# Patient Record
Sex: FEMALE | Race: BLACK OR AFRICAN AMERICAN | NOT HISPANIC OR LATINO | Employment: FULL TIME | URBAN - METROPOLITAN AREA
[De-identification: names, ages, dates, MRNs, and addresses within clinical notes are randomized per-mention and may not be internally consistent; named-entity substitution may affect disease eponyms.]

---

## 2017-01-13 ENCOUNTER — HOSPITAL ENCOUNTER (EMERGENCY)
Facility: HOSPITAL | Age: 27
Discharge: HOME | End: 2017-01-13
Attending: EMERGENCY MEDICINE | Admitting: EMERGENCY MEDICINE

## 2017-01-13 VITALS
SYSTOLIC BLOOD PRESSURE: 115 MMHG | HEART RATE: 60 BPM | DIASTOLIC BLOOD PRESSURE: 90 MMHG | OXYGEN SATURATION: 93 % | HEIGHT: 69 IN | BODY MASS INDEX: 19.99 KG/M2 | TEMPERATURE: 97.3 F | WEIGHT: 135 LBS | RESPIRATION RATE: 16 BRPM

## 2017-01-13 DIAGNOSIS — R56.9 SEIZURE (HCC): ICD-10-CM

## 2017-01-13 DIAGNOSIS — N39.0 ACUTE UTI: Primary | ICD-10-CM

## 2017-01-13 LAB
ALBUMIN SERPL-MCNC: 4 G/DL (ref 3.5–5.2)
ALBUMIN/GLOB SERPL: 1 G/DL
ALP SERPL-CCNC: 70 U/L (ref 39–117)
ALT SERPL W P-5'-P-CCNC: 15 U/L (ref 1–33)
AMPHET+METHAMPHET UR QL: NEGATIVE
ANION GAP SERPL CALCULATED.3IONS-SCNC: 15.3 MMOL/L
AST SERPL-CCNC: 19 U/L (ref 1–32)
BACTERIA UR QL AUTO: ABNORMAL /HPF
BARBITURATES UR QL SCN: NEGATIVE
BASOPHILS # BLD AUTO: 0.01 10*3/MM3 (ref 0–0.2)
BASOPHILS NFR BLD AUTO: 0.2 % (ref 0–1.5)
BENZODIAZ UR QL SCN: NEGATIVE
BILIRUB SERPL-MCNC: 0.2 MG/DL (ref 0.1–1.2)
BILIRUB UR QL STRIP: NEGATIVE
BUN BLD-MCNC: 14 MG/DL (ref 6–20)
BUN/CREAT SERPL: 15.4 (ref 7–25)
CALCIUM SPEC-SCNC: 9.2 MG/DL (ref 8.6–10.5)
CANNABINOIDS SERPL QL: POSITIVE
CHLORIDE SERPL-SCNC: 102 MMOL/L (ref 98–107)
CLARITY UR: ABNORMAL
CO2 SERPL-SCNC: 24.7 MMOL/L (ref 22–29)
COCAINE UR QL: NEGATIVE
COLOR UR: YELLOW
CREAT BLD-MCNC: 0.91 MG/DL (ref 0.57–1)
DEPRECATED RDW RBC AUTO: 37.6 FL (ref 37–54)
EOSINOPHIL # BLD AUTO: 0.07 10*3/MM3 (ref 0–0.7)
EOSINOPHIL NFR BLD AUTO: 1.6 % (ref 0.3–6.2)
ERYTHROCYTE [DISTWIDTH] IN BLOOD BY AUTOMATED COUNT: 12.3 % (ref 11.7–13)
GFR SERPL CREATININE-BSD FRML MDRD: 92 ML/MIN/1.73
GLOBULIN UR ELPH-MCNC: 3.9 GM/DL
GLUCOSE BLD-MCNC: 98 MG/DL (ref 65–99)
GLUCOSE UR STRIP-MCNC: NEGATIVE MG/DL
HCT VFR BLD AUTO: 39 % (ref 35.6–45.5)
HGB BLD-MCNC: 12.6 G/DL (ref 11.9–15.5)
HGB UR QL STRIP.AUTO: ABNORMAL
HYALINE CASTS UR QL AUTO: ABNORMAL /LPF
IMM GRANULOCYTES # BLD: 0 10*3/MM3 (ref 0–0.03)
IMM GRANULOCYTES NFR BLD: 0 % (ref 0–0.5)
KETONES UR QL STRIP: NEGATIVE
LEUKOCYTE ESTERASE UR QL STRIP.AUTO: ABNORMAL
LYMPHOCYTES # BLD AUTO: 1.64 10*3/MM3 (ref 0.9–4.8)
LYMPHOCYTES NFR BLD AUTO: 37.8 % (ref 19.6–45.3)
MAGNESIUM SERPL-MCNC: 2 MG/DL (ref 1.6–2.6)
MCH RBC QN AUTO: 27.6 PG (ref 26.9–32)
MCHC RBC AUTO-ENTMCNC: 32.3 G/DL (ref 32.4–36.3)
MCV RBC AUTO: 85.3 FL (ref 80.5–98.2)
METHADONE UR QL SCN: NEGATIVE
MONOCYTES # BLD AUTO: 0.24 10*3/MM3 (ref 0.2–1.2)
MONOCYTES NFR BLD AUTO: 5.5 % (ref 5–12)
NEUTROPHILS # BLD AUTO: 2.38 10*3/MM3 (ref 1.9–8.1)
NEUTROPHILS NFR BLD AUTO: 54.9 % (ref 42.7–76)
NITRITE UR QL STRIP: NEGATIVE
OPIATES UR QL: NEGATIVE
OXYCODONE UR QL SCN: NEGATIVE
PH UR STRIP.AUTO: 6.5 [PH] (ref 5–8)
PLATELET # BLD AUTO: 355 10*3/MM3 (ref 140–500)
PMV BLD AUTO: 9.9 FL (ref 6–12)
POTASSIUM BLD-SCNC: 3.8 MMOL/L (ref 3.5–5.2)
PROT SERPL-MCNC: 7.9 G/DL (ref 6–8.5)
PROT UR QL STRIP: NEGATIVE
RBC # BLD AUTO: 4.57 10*6/MM3 (ref 3.9–5.2)
RBC # UR: ABNORMAL /HPF
REF LAB TEST METHOD: ABNORMAL
SODIUM BLD-SCNC: 142 MMOL/L (ref 136–145)
SP GR UR STRIP: 1.01 (ref 1–1.03)
SQUAMOUS #/AREA URNS HPF: ABNORMAL /HPF
UROBILINOGEN UR QL STRIP: ABNORMAL
WBC NRBC COR # BLD: 4.34 10*3/MM3 (ref 4.5–10.7)
WBC UR QL AUTO: ABNORMAL /HPF

## 2017-01-13 PROCEDURE — 80053 COMPREHEN METABOLIC PANEL: CPT | Performed by: EMERGENCY MEDICINE

## 2017-01-13 PROCEDURE — 80307 DRUG TEST PRSMV CHEM ANLYZR: CPT | Performed by: EMERGENCY MEDICINE

## 2017-01-13 PROCEDURE — 87086 URINE CULTURE/COLONY COUNT: CPT | Performed by: EMERGENCY MEDICINE

## 2017-01-13 PROCEDURE — 87186 SC STD MICRODIL/AGAR DIL: CPT | Performed by: EMERGENCY MEDICINE

## 2017-01-13 PROCEDURE — 81001 URINALYSIS AUTO W/SCOPE: CPT | Performed by: EMERGENCY MEDICINE

## 2017-01-13 PROCEDURE — 99285 EMERGENCY DEPT VISIT HI MDM: CPT

## 2017-01-13 PROCEDURE — 83735 ASSAY OF MAGNESIUM: CPT | Performed by: EMERGENCY MEDICINE

## 2017-01-13 PROCEDURE — 85025 COMPLETE CBC W/AUTO DIFF WBC: CPT | Performed by: EMERGENCY MEDICINE

## 2017-01-13 RX ORDER — ACETAMINOPHEN 500 MG
1000 TABLET ORAL ONCE
Status: COMPLETED | OUTPATIENT
Start: 2017-01-13 | End: 2017-01-13

## 2017-01-13 RX ORDER — TOPIRAMATE 50 MG/1
50 TABLET, FILM COATED ORAL 2 TIMES DAILY
Qty: 60 TABLET | Refills: 0 | Status: SHIPPED | OUTPATIENT
Start: 2017-01-13 | End: 2017-08-22

## 2017-01-13 RX ORDER — CEPHALEXIN 500 MG/1
500 CAPSULE ORAL 3 TIMES DAILY
Qty: 30 CAPSULE | Refills: 0 | Status: SHIPPED | OUTPATIENT
Start: 2017-01-13 | End: 2017-08-22

## 2017-01-13 RX ORDER — SODIUM CHLORIDE 0.9 % (FLUSH) 0.9 %
10 SYRINGE (ML) INJECTION AS NEEDED
Status: DISCONTINUED | OUTPATIENT
Start: 2017-01-13 | End: 2017-01-13 | Stop reason: HOSPADM

## 2017-01-13 RX ADMIN — ACETAMINOPHEN 1000 MG: 500 TABLET ORAL at 10:36

## 2017-01-13 RX ADMIN — SODIUM CHLORIDE 500 ML: 9 INJECTION, SOLUTION INTRAVENOUS at 10:30

## 2017-01-13 NOTE — ED PROVIDER NOTES
EMERGENCY DEPARTMENT ENCOUNTER    CHIEF COMPLAINT  Chief Complaint: seizure  History given by: patient  History limited by: nothing  Room Number: 27/27  PMD: No Known Provider  OB/GYN- Dr. Zelda Ross    HPI:  Pt is a 26 y.o. female with a history of seizures, who presents complaining of a seizure this morning. Pt states that she was taken off of her Topamax since she was recently pregnant and has not restarted her Topamax. Pt denies fever, chills or N/V. Pt is breast feeding.    Duration:  Just PTA  Onset: sudden  Timing: constant  Location: generalized  Radiation: none  Quality: unspecified  Intensity/Severity: moderate  Progression: resolved  Associated Symptoms: none  Aggravating Factors: none  Alleviating Factors: none  Previous Episodes: Pt has a history of seizures.  Treatment before arrival: none    PAST MEDICAL HISTORY  Active Ambulatory Problems     Diagnosis Date Noted   • Headache 08/05/2013   • Seizure disorder 07/10/2013   • Gastroesophageal reflux disease without esophagitis 11/03/2016   • 32 weeks gestation of pregnancy 11/03/2016   • Pregnancy 12/03/2016   • Normal labor 12/28/2016     Resolved Ambulatory Problems     Diagnosis Date Noted   • No Resolved Ambulatory Problems     Past Medical History   Diagnosis Date   • Chronic kidney disease    • Seizures    • Urinary tract infection    • Vaginal delivery 12/22/2013       PAST SURGICAL HISTORY  Past Surgical History   Procedure Laterality Date   • [place] tooth extraction  2015       FAMILY HISTORY  Family History   Problem Relation Age of Onset   • Heart disease Other    • Diabetes Other    • Hypertension Other    • Migraines Other    • Breast cancer Other    • Seizures Other    • Prostate cancer Other    • Ovarian cancer Other    • No Known Problems Father    • No Known Problems Mother    • No Known Problems Brother    • No Known Problems Daughter    • No Known Problems Paternal Grandfather    • No Known Problems Paternal Grandmother    • No  Known Problems Maternal Grandmother    • Prostate cancer Maternal Grandfather    • Coronary artery disease Maternal Grandfather        SOCIAL HISTORY  Social History     Social History   • Marital status: Single     Spouse name: N/A   • Number of children: N/A   • Years of education: N/A     Occupational History   • Not on file.     Social History Main Topics   • Smoking status: Former Smoker     Types: Cigarettes     Quit date: 4/1/2016   • Smokeless tobacco: Never Used   • Alcohol use No   • Drug use: Yes     Special: Marijuana      Comment: used prior to pregnancy   • Sexual activity: Yes     Partners: Male     Birth control/ protection: None     Other Topics Concern   • Not on file     Social History Narrative       ALLERGIES  Codeine    REVIEW OF SYSTEMS  Review of Systems   Constitutional: Negative for fever.   HENT: Negative for sore throat.    Eyes: Negative.    Respiratory: Negative for cough and shortness of breath.    Cardiovascular: Negative for chest pain.   Gastrointestinal: Negative for abdominal pain, diarrhea and vomiting.   Genitourinary: Negative for dysuria.   Musculoskeletal: Negative for neck pain.   Skin: Negative for rash.   Allergic/Immunologic: Negative.    Neurological: Positive for seizures. Negative for weakness, numbness and headaches.   Hematological: Negative.    Psychiatric/Behavioral: Negative.    All other systems reviewed and are negative.      PHYSICAL EXAM  ED Triage Vitals   Temp Heart Rate Resp BP SpO2   01/13/17 1030 01/13/17 0954 01/13/17 0954 01/13/17 0954 01/13/17 0954   97.8 °F (36.6 °C) 81 16 135/89 100 %      Temp src Heart Rate Source Patient Position BP Location FiO2 (%)   -- -- 01/13/17 0954 01/13/17 0954 --     Lying Right arm      Physical Exam   Constitutional: She is oriented to person, place, and time and well-developed, well-nourished, and in no distress. No distress.   HENT:   Head: Normocephalic and atraumatic.   Superficial lateral bite geri to the right  side of the tongue   Eyes: EOM are normal. Pupils are equal, round, and reactive to light.   Neck: Normal range of motion. Neck supple.   Cardiovascular: Normal rate, regular rhythm and normal heart sounds.    Pulmonary/Chest: Effort normal and breath sounds normal. No respiratory distress.   Abdominal: Soft. There is no tenderness. There is no rebound and no guarding.   Musculoskeletal: Normal range of motion. She exhibits no edema.   Neurological: She is alert and oriented to person, place, and time. She has normal sensation and normal strength.   Skin: Skin is warm and dry. No rash noted.   Psychiatric: Mood and affect normal.   Nursing note and vitals reviewed.      LAB RESULTS  Lab Results (last 24 hours)     Procedure Component Value Units Date/Time    CBC & Differential [[id number]] Collected:  01/13/17 1029    Specimen:  Blood Updated:  01/13/17 1045    Narrative:       The following orders were created for panel order CBC & Differential.  Procedure                               Abnormality         Status                     ---------                               -----------         ------                     CBC Auto Differential[[id number]]         Abnormal            Final result                 Please view results for these tests on the individual orders.    Comprehensive Metabolic Panel [[id number]] Collected:  01/13/17 1029    Specimen:  Blood Updated:  01/13/17 1108     Glucose 98 mg/dL      BUN 14 mg/dL      Creatinine 0.91 mg/dL      Sodium 142 mmol/L      Potassium 3.8 mmol/L      Chloride 102 mmol/L      CO2 24.7 mmol/L      Calcium 9.2 mg/dL      Total Protein 7.9 g/dL      Albumin 4.00 g/dL      ALT (SGPT) 15 U/L      AST (SGOT) 19 U/L      Alkaline Phosphatase 70 U/L      Total Bilirubin 0.2 mg/dL      eGFR  African Amer 92 mL/min/1.73      Globulin 3.9 gm/dL      A/G Ratio 1.0 g/dL      BUN/Creatinine Ratio 15.4      Anion Gap 15.3 mmol/L     Magnesium [[id number]]  (Normal) Collected:  01/13/17 1029     Specimen:  Blood Updated:  01/13/17 1108     Magnesium 2.0 mg/dL     CBC Auto Differential [[id number]]  (Abnormal) Collected:  01/13/17 1029    Specimen:  Blood Updated:  01/13/17 1045     WBC 4.34 (L) 10*3/mm3      RBC 4.57 10*6/mm3      Hemoglobin 12.6 g/dL      Hematocrit 39.0 %      MCV 85.3 fL      MCH 27.6 pg      MCHC 32.3 (L) g/dL      RDW 12.3 %      RDW-SD 37.6 fl      MPV 9.9 fL      Platelets 355 10*3/mm3      Neutrophil % 54.9 %      Lymphocyte % 37.8 %      Monocyte % 5.5 %      Eosinophil % 1.6 %      Basophil % 0.2 %      Immature Grans % 0.0 %      Neutrophils, Absolute 2.38 10*3/mm3      Lymphocytes, Absolute 1.64 10*3/mm3      Monocytes, Absolute 0.24 10*3/mm3      Eosinophils, Absolute 0.07 10*3/mm3      Basophils, Absolute 0.01 10*3/mm3      Immature Grans, Absolute 0.00 10*3/mm3     Urinalysis With / Culture If Indicated [[id number]]  (Abnormal) Collected:  01/13/17 1143    Specimen:  Urine from Urine, Clean Catch Updated:  01/13/17 1203     Color, UA Yellow      Appearance, UA Cloudy (A)      pH, UA 6.5      Specific Gravity, UA 1.014      Glucose, UA Negative      Ketones, UA Negative      Bilirubin, UA Negative      Blood, UA Large (3+) (A)      Protein, UA Negative      Leuk Esterase, UA Large (3+) (A)      Nitrite, UA Negative      Urobilinogen, UA 0.2 E.U./dL     Urine Drug Screen [[id number]]  (Abnormal) Collected:  01/13/17 1143    Specimen:  Urine from Urine, Clean Catch Updated:  01/13/17 1231     Amphet/Methamphet, Screen Negative      Barbiturates Screen, Urine Negative      Benzodiazepine Screen, Urine Negative      Cocaine Screen, Urine Negative      Opiate Screen Negative      THC, Screen, Urine Positive (A)      Methadone Screen, Urine Negative      Oxycodone Screen, Urine Negative     Narrative:       Negative Thresholds For Drugs Screened:     Amphetamines               500 ng/ml   Barbiturates               200 ng/ml   Benzodiazepines            100 ng/ml   Cocaine                     300 ng/ml   Methadone                  300 ng/ml   Opiates                    300 ng/ml   Oxycodone                  100 ng/ml   THC                        50 ng/ml    The Normal Value for all drugs tested is negative. This report includes final unconfirmed screening results to be used for medical treatment purposes only. Unconfirmed results must not be used for non-medical purposes such as employment or legal testing. Clinical consideration should be applied to any drug of abuse test, particulary when unconfirmed results are used.    Urinalysis, Microscopic Only [[id number]]  (Abnormal) Collected:  01/13/17 1143    Specimen:  Urine from Urine, Clean Catch Updated:  01/13/17 1203     RBC, UA 0-2 (A) /HPF      WBC, UA Too Numerous to Count (A) /HPF      Bacteria, UA 3+ (A) /HPF      Squamous Epithelial Cells, UA 0-2 /HPF      Hyaline Casts, UA 0-2 /LPF      Methodology Automated Microscopy     Urine Culture [[id number]] Collected:  01/13/17 1143    Specimen:  Urine from Urine, Clean Catch Updated:  01/13/17 1201          I ordered the above labs and reviewed the results    PROCEDURES  Procedures      PROGRESS AND CONSULTS  ED Course     1009- Ordered blood work, magnesium and UA for further evaluation.    1011- Ordered UDS for further evaluation.    1019- Ordered IVF for hydration.    1032- Per RN, the pt is complaining of a HA. Ordered tylenol for HA.    10:33 AM  Latest vital signs   BP- 135/89 HR- 81 Temp- 97.8 °F (36.6 °C) O2 sat- 100%    1125- Pt has not been able to provide a urine sample yet at this time. Notified pt of the need for a urine sample. Pt voiced understanding.    1220- Placed call to Dr. Ross (OB/GYN) for consult.     1229- Discussed the pt's case with Dr. Lemuel Lackey (OB/GYN) who states that it is safe to restart the pt on Topamax. He agrees to see the pt in follow up at the office    1237- Rechecked pt. Pt is resting comfortably. Notified pt and family of the pt's unremarkable lab results,  other than her UTI. Discussed the consult with Dr. Lackey (OB/GYN) and the plan to restart the pt on Topamax. Discussed the risks of Topamax and breast feeding and that Dr. Lackey recommends that the pt not breast feed while on Topamax. Discussed the plan to discharge the pt home. Pt agrees with the plan and all questions were addressed.    12:41 PM  Latest vital signs   BP- 115/90 HR- 60 Temp- 97.8 °F (36.6 °C) O2 sat- 99%    MEDICAL DECISION MAKING  Results were reviewed/discussed with the patient and they were also made aware of online access. Pt also made aware that some labs, such as cultures, will not be resulted during ER visit and follow up with PMD is necessary.     MDM  Number of Diagnoses or Management Options  Acute UTI:   Seizure:      Amount and/or Complexity of Data Reviewed  Clinical lab tests: ordered and reviewed (UA shows UTI)  Decide to obtain previous medical records or to obtain history from someone other than the patient: yes  Review and summarize past medical records: yes (Pt was admitted on 12-27-16 for a vaginal delivery.)  Discuss the patient with other providers: yes (D/w Dr. Lemuel Lackey (OB/GYN))           DIAGNOSIS  Final diagnoses:   Acute UTI   Seizure       DISPOSITION  DISCHARGE    Patient discharged in stable condition.    Reviewed implications of results, diagnosis, meds, responsibility to follow up, warning signs and symptoms of possible worsening, potential complications and reasons to return to ER, including fever, worsening symptoms or any concerns.    Patient/Family voiced understanding of above instructions.    Discussed plan for discharge, as there is no emergent indication for admission.  Pt/family is agreeable and understands need for follow up and repeat testing.  Pt is aware that discharge does not mean that nothing is wrong but it indicates no emergency is present that requires admission and they must continue care with follow-up as given below or physician of their  choice.     FOLLOW-UP  call [facility] for primary care physician    Schedule an appointment as soon as possible for a visit      Zelda Ross MD  [address] [facility] [address] [phone]    Schedule an appointment as soon as possible for a visit      Manuel Khan MD  [address] [facility] [address]  [facility] [id number] [phone]    Schedule an appointment as soon as possible for a visit           Medication List      New Prescriptions          cephalexin 500 MG capsule   Commonly known as:  KEFLEX   Take 1 capsule by mouth 3 (Three) Times a Day.       topiramate 50 MG tablet   Commonly known as:  TOPAMAX   Take 1 tablet by mouth 2 (Two) Times a Day.         Stop          PROMETHAZINE HCL PO       raNITIdine 150 MG tablet   Commonly known as:  ZANTAC               Latest Documented Vital Signs:  As of 12:59 PM  BP- 115/90 HR- 60 Temp- 97.3 °F (36.3 °C) O2 sat- 93%    --  Documentation assistance provided by jigna Gloria for Dr. Soler.  Information recorded by the scribe was done at my direction and has been verified and validated by me.     Ana Gloria  01/13/17 1243       Brian Soler MD  01/13/17 1300

## 2017-01-13 NOTE — ED NOTES
Pt reports seizure occurred this morning; pt not currently taking Topamax due to recent pregnancy and she is currently breastfeeding. Reassurance given; call light in reach. Pts breathing even and unlabored. Pt appears in NAD at this time.        Marie Marquez RN  01/13/17 6959

## 2017-01-15 LAB — BACTERIA SPEC AEROBE CULT: ABNORMAL

## 2017-08-21 ENCOUNTER — TELEPHONE (OUTPATIENT)
Dept: OBSTETRICS AND GYNECOLOGY | Age: 27
End: 2017-08-21

## 2017-08-21 NOTE — TELEPHONE ENCOUNTER
Dr DINH pt, states she used a baby wipe last night to wipe herself and has had burning and itching since then. Wants to be seen today. Please advise.    Pt # [phone]

## 2017-08-22 ENCOUNTER — OFFICE VISIT (OUTPATIENT)
Dept: OBSTETRICS AND GYNECOLOGY | Age: 27
End: 2017-08-22

## 2017-08-22 VITALS
HEIGHT: 69 IN | DIASTOLIC BLOOD PRESSURE: 70 MMHG | WEIGHT: 145 LBS | BODY MASS INDEX: 21.48 KG/M2 | SYSTOLIC BLOOD PRESSURE: 110 MMHG

## 2017-08-22 DIAGNOSIS — N76.0 ACUTE VAGINITIS: Primary | ICD-10-CM

## 2017-08-22 DIAGNOSIS — Z77.21 EXPOSURE TO BLOOD OR BODY FLUID: ICD-10-CM

## 2017-08-22 PROCEDURE — 99213 OFFICE O/P EST LOW 20 MIN: CPT | Performed by: NURSE PRACTITIONER

## 2017-08-22 RX ORDER — CLOTRIMAZOLE AND BETAMETHASONE DIPROPIONATE 10; .64 MG/G; MG/G
CREAM TOPICAL 2 TIMES DAILY
Qty: 15 G | Refills: 0 | Status: SHIPPED | OUTPATIENT
Start: 2017-08-22 | End: 2020-08-14

## 2017-08-22 NOTE — PROGRESS NOTES
"Subjective   Анна Lopez is a 27 y.o. female is being seen today for   Chief Complaint   Patient presents with   • Vaginitis     Pt c/o vaginal irritation after using a baby wipe.   .    History of Present Illness     Patient here for some vaginal irritation.  She is a new patient to me and this is a new problem.  She noticed it two nights ago after using a baby wipe to wipe with.  She is having some external irritation, itching and has noticed an increase in odor.  No change in discharge and the irritation seems to come and go.  She would also like an std check. She declines the need for serum testing.  No new partners.      The following portions of the patient's history were reviewed and updated as appropriate: allergies, current medications, past family history, past medical history, past social history, past surgical history and problem list.    /70  Ht 69\" (175.3 cm)  Wt 145 lb (65.8 kg)  LMP 08/12/2017  BMI 21.41 kg/m2      Review of Systems   Constitutional: Negative.  Negative for fever.   HENT: Negative.    Eyes: Negative.    Respiratory: Negative.    Cardiovascular: Negative.    Gastrointestinal: Negative.  Negative for abdominal pain, constipation, nausea and vomiting.   Endocrine: Negative.    Genitourinary: Positive for vaginal pain. Negative for dyspareunia, dysuria, menstrual problem, pelvic pain, vaginal bleeding and vaginal discharge.   Musculoskeletal: Negative.    Skin: Negative.    Allergic/Immunologic: Negative.    Neurological: Negative.    Hematological: Negative.    Psychiatric/Behavioral: Negative.           Objective   Physical Exam   Constitutional: She is oriented to person, place, and time. She appears well-developed and well-nourished.   Genitourinary: Uterus normal. There is no rash or lesion on the right labia. There is no rash or lesion on the left labia. Uterus is not tender. Cervix exhibits no motion tenderness, no discharge and no friability.   Genitourinary " Comments: Moderate watery discharge noted, vagina otherwise normal.  No lesions.    Neurological: She is alert and oriented to person, place, and time.   Psychiatric: She has a normal mood and affect. Her behavior is normal.         Assessment/Plan   Анна was seen today for vaginitis.    Diagnoses and all orders for this visit:    Acute vaginitis  -     NuSwab VG+    Exposure to blood or body fluid  -     NuSwab VG+    Other orders  -     clotrimazole-betamethasone (LOTRISONE) 1-0.05 % cream; Apply  topically 2 (Two) Times a Day.        Will call with culture results, can use lotrisone externally for now.  Exam is normal with no obvious s/s of yeast and some slight watery discharge.  Follow up for AE and PRN.

## 2017-08-26 LAB
A VAGINAE DNA VAG QL NAA+PROBE: ABNORMAL SCORE
BVAB2 DNA VAG QL NAA+PROBE: ABNORMAL SCORE
C ALBICANS DNA VAG QL NAA+PROBE: POSITIVE
C GLABRATA DNA VAG QL NAA+PROBE: NEGATIVE
C TRACH RRNA SPEC QL NAA+PROBE: NEGATIVE
MEGA1 DNA VAG QL NAA+PROBE: ABNORMAL SCORE
N GONORRHOEA RRNA SPEC QL NAA+PROBE: NEGATIVE
T VAGINALIS RRNA SPEC QL NAA+PROBE: NEGATIVE

## 2017-08-29 ENCOUNTER — TELEPHONE (OUTPATIENT)
Dept: OBSTETRICS AND GYNECOLOGY | Age: 27
End: 2017-08-29

## 2017-08-29 RX ORDER — FLUCONAZOLE 150 MG/1
150 TABLET ORAL
Qty: 2 TABLET | Refills: 0 | Status: SHIPPED | OUTPATIENT
Start: 2017-08-29 | End: 2020-08-14

## 2017-08-29 RX ORDER — METRONIDAZOLE 500 MG/1
500 TABLET ORAL 2 TIMES DAILY
Qty: 14 TABLET | Refills: 0 | Status: SHIPPED | OUTPATIENT
Start: 2017-08-29 | End: 2017-09-05

## 2017-08-29 NOTE — TELEPHONE ENCOUNTER
----- Message from MAURI Issa sent at 8/29/2017 11:38 AM EDT -----  Notify vaginal culture was positive for BV and yeast.  I sent over prescriptions for both.  Verify no allergies to flagyl.

## 2017-08-29 NOTE — TELEPHONE ENCOUNTER
Pt notified. No allergy to Flagyl. Told patient to take Flagyl first and when finished with that she should take the Diflucan.

## 2018-02-14 ENCOUNTER — HOSPITAL ENCOUNTER (EMERGENCY)
Facility: HOSPITAL | Age: 28
Discharge: LEFT BEFORE DISCHARGE | End: 2018-02-14

## 2018-02-14 VITALS
WEIGHT: 150 LBS | RESPIRATION RATE: 16 BRPM | DIASTOLIC BLOOD PRESSURE: 91 MMHG | BODY MASS INDEX: 22.22 KG/M2 | HEART RATE: 94 BPM | TEMPERATURE: 98 F | OXYGEN SATURATION: 98 % | SYSTOLIC BLOOD PRESSURE: 135 MMHG | HEIGHT: 69 IN

## 2018-02-14 LAB
ALBUMIN SERPL-MCNC: 4.4 G/DL (ref 3.5–5.2)
ALBUMIN/GLOB SERPL: 1.2 G/DL
ALP SERPL-CCNC: 43 U/L (ref 39–117)
ALT SERPL W P-5'-P-CCNC: 12 U/L (ref 1–33)
ANION GAP SERPL CALCULATED.3IONS-SCNC: 12.7 MMOL/L
AST SERPL-CCNC: 16 U/L (ref 1–32)
BASOPHILS # BLD AUTO: 0.01 10*3/MM3 (ref 0–0.2)
BASOPHILS NFR BLD AUTO: 0.1 % (ref 0–1.5)
BILIRUB SERPL-MCNC: 0.4 MG/DL (ref 0.1–1.2)
BUN BLD-MCNC: 13 MG/DL (ref 6–20)
BUN/CREAT SERPL: 14.1 (ref 7–25)
CALCIUM SPEC-SCNC: 9.3 MG/DL (ref 8.6–10.5)
CHLORIDE SERPL-SCNC: 101 MMOL/L (ref 98–107)
CO2 SERPL-SCNC: 25.3 MMOL/L (ref 22–29)
CREAT BLD-MCNC: 0.92 MG/DL (ref 0.57–1)
DEPRECATED RDW RBC AUTO: 39.8 FL (ref 37–54)
EOSINOPHIL # BLD AUTO: 0.03 10*3/MM3 (ref 0–0.7)
EOSINOPHIL NFR BLD AUTO: 0.3 % (ref 0.3–6.2)
ERYTHROCYTE [DISTWIDTH] IN BLOOD BY AUTOMATED COUNT: 12.4 % (ref 11.7–13)
GFR SERPL CREATININE-BSD FRML MDRD: 90 ML/MIN/1.73
GLOBULIN UR ELPH-MCNC: 3.6 GM/DL
GLUCOSE BLD-MCNC: 90 MG/DL (ref 65–99)
HCG SERPL QL: NEGATIVE
HCT VFR BLD AUTO: 39.7 % (ref 35.6–45.5)
HGB BLD-MCNC: 13.1 G/DL (ref 11.9–15.5)
HOLD SPECIMEN: NORMAL
IMM GRANULOCYTES # BLD: 0.03 10*3/MM3 (ref 0–0.03)
IMM GRANULOCYTES NFR BLD: 0.3 % (ref 0–0.5)
LIPASE SERPL-CCNC: 29 U/L (ref 13–60)
LYMPHOCYTES # BLD AUTO: 2.23 10*3/MM3 (ref 0.9–4.8)
LYMPHOCYTES NFR BLD AUTO: 19.1 % (ref 19.6–45.3)
MCH RBC QN AUTO: 29.1 PG (ref 26.9–32)
MCHC RBC AUTO-ENTMCNC: 33 G/DL (ref 32.4–36.3)
MCV RBC AUTO: 88.2 FL (ref 80.5–98.2)
MONOCYTES # BLD AUTO: 0.78 10*3/MM3 (ref 0.2–1.2)
MONOCYTES NFR BLD AUTO: 6.7 % (ref 5–12)
NEUTROPHILS # BLD AUTO: 8.61 10*3/MM3 (ref 1.9–8.1)
NEUTROPHILS NFR BLD AUTO: 73.5 % (ref 42.7–76)
PLATELET # BLD AUTO: 318 10*3/MM3 (ref 140–500)
PMV BLD AUTO: 10.5 FL (ref 6–12)
POTASSIUM BLD-SCNC: 3.9 MMOL/L (ref 3.5–5.2)
PROT SERPL-MCNC: 8 G/DL (ref 6–8.5)
RBC # BLD AUTO: 4.5 10*6/MM3 (ref 3.9–5.2)
SODIUM BLD-SCNC: 139 MMOL/L (ref 136–145)
WBC NRBC COR # BLD: 11.69 10*3/MM3 (ref 4.5–10.7)
WHOLE BLOOD HOLD SPECIMEN: NORMAL
WHOLE BLOOD HOLD SPECIMEN: NORMAL

## 2018-02-14 PROCEDURE — 36415 COLL VENOUS BLD VENIPUNCTURE: CPT

## 2018-02-14 PROCEDURE — 85025 COMPLETE CBC W/AUTO DIFF WBC: CPT

## 2018-02-14 PROCEDURE — 84703 CHORIONIC GONADOTROPIN ASSAY: CPT

## 2018-02-14 PROCEDURE — 80053 COMPREHEN METABOLIC PANEL: CPT

## 2018-02-14 PROCEDURE — 83690 ASSAY OF LIPASE: CPT

## 2018-02-14 PROCEDURE — 99211 OFF/OP EST MAY X REQ PHY/QHP: CPT

## 2018-02-14 RX ORDER — SODIUM CHLORIDE 0.9 % (FLUSH) 0.9 %
10 SYRINGE (ML) INJECTION AS NEEDED
Status: DISCONTINUED | OUTPATIENT
Start: 2018-02-14 | End: 2018-02-14 | Stop reason: HOSPADM

## 2018-02-14 NOTE — ED TRIAGE NOTES
Pt reports pelvic pain and low back pain x1 week, fever of 101 last night, reports episode of vomiting and onset of hematuria today. Reports hx of frequent UTIs. Last dose Tylenol last night

## 2018-02-14 NOTE — ED TRIAGE NOTES
Call to place patient in ED bed X 3 with no answer. Staff not notified prior to patient leaving. Unable to get signature on LWBS form.

## 2018-02-14 NOTE — ED NOTES
"Patient states \"I have a lot of blood in my urine. It started today.     Ade Hernandez RN  02/14/18 9720    "

## 2020-07-15 ENCOUNTER — TELEPHONE (OUTPATIENT)
Dept: OBSTETRICS AND GYNECOLOGY | Age: 30
End: 2020-07-15

## 2020-07-15 NOTE — TELEPHONE ENCOUNTER
(Cody pt) Pt has no showed three times within the last year, would you like her dismissed?    8/5/2019 Annual Cody  3/10/2020 GYN [facility]  7/14/2020 Annual Cody

## 2020-08-05 ENCOUNTER — TELEPHONE (OUTPATIENT)
Dept: OBSTETRICS AND GYNECOLOGY | Age: 30
End: 2020-08-05

## 2020-08-05 NOTE — TELEPHONE ENCOUNTER
NOB pt ~4 weeks , not sure of LMP, she has NOB appt scheduled 08/14/20.   She states she is experiencing extreme nausea that last all day which is making it difficult to work.  Would we please call something in for her.  Pharmacy updated.     She was last seen in our office 08/22/2017

## 2020-08-05 NOTE — TELEPHONE ENCOUNTER
Per Sherly, please recommend Unisom and Vit B-6 OTC. If severe or persistent N/V occurs please advise pt to go to the ER for evaluation.

## 2020-08-05 NOTE — TELEPHONE ENCOUNTER
Generally we suggest starting with the OTC option which is the safest option. I also recommend a bland, boring diet and carlyle products-soda, candies as well. If her sx's don't improve, can check with dr Ross to see if she is ok with prescribing something else

## 2020-08-07 RX ORDER — PROMETHAZINE HYDROCHLORIDE 12.5 MG/1
12.5 TABLET ORAL EVERY 6 HOURS PRN
Qty: 20 TABLET | Refills: 1 | Status: SHIPPED | OUTPATIENT
Start: 2020-08-07 | End: 2020-12-10

## 2020-08-14 ENCOUNTER — PROCEDURE VISIT (OUTPATIENT)
Dept: OBSTETRICS AND GYNECOLOGY | Age: 30
End: 2020-08-14

## 2020-08-14 ENCOUNTER — OFFICE VISIT (OUTPATIENT)
Dept: OBSTETRICS AND GYNECOLOGY | Age: 30
End: 2020-08-14

## 2020-08-14 VITALS
HEIGHT: 69 IN | DIASTOLIC BLOOD PRESSURE: 70 MMHG | BODY MASS INDEX: 22.36 KG/M2 | WEIGHT: 151 LBS | SYSTOLIC BLOOD PRESSURE: 114 MMHG

## 2020-08-14 DIAGNOSIS — O36.80X0 ENCOUNTER TO DETERMINE FETAL VIABILITY OF PREGNANCY, SINGLE OR UNSPECIFIED FETUS: Primary | ICD-10-CM

## 2020-08-14 DIAGNOSIS — Z11.4 ENCOUNTER FOR SCREENING FOR HIV: ICD-10-CM

## 2020-08-14 DIAGNOSIS — Z11.3 SCREEN FOR STD (SEXUALLY TRANSMITTED DISEASE): ICD-10-CM

## 2020-08-14 DIAGNOSIS — Z34.81 PRENATAL CARE, SUBSEQUENT PREGNANCY, FIRST TRIMESTER: ICD-10-CM

## 2020-08-14 DIAGNOSIS — Z3A.01 7 WEEKS GESTATION OF PREGNANCY: ICD-10-CM

## 2020-08-14 DIAGNOSIS — Z36.9 ANTENATAL SCREENING ENCOUNTER: ICD-10-CM

## 2020-08-14 DIAGNOSIS — Z02.83 ENCOUNTER FOR DRUG SCREENING: ICD-10-CM

## 2020-08-14 DIAGNOSIS — Z32.00 POSSIBLE PREGNANCY, NOT CONFIRMED: Primary | ICD-10-CM

## 2020-08-14 DIAGNOSIS — Z32.00 ENCOUNTER FOR CONFIRMATION OF PREGNANCY TEST RESULT WITH PHYSICAL EXAMINATION: ICD-10-CM

## 2020-08-14 PROCEDURE — 76817 TRANSVAGINAL US OBSTETRIC: CPT | Performed by: OBSTETRICS & GYNECOLOGY

## 2020-08-14 PROCEDURE — 99214 OFFICE O/P EST MOD 30 MIN: CPT | Performed by: OBSTETRICS & GYNECOLOGY

## 2020-08-14 RX ORDER — VITAMIN A ACETATE, .BETA.-CAROTENE, ASCORBIC ACID, CHOLECALCIFEROL, .ALPHA.-TOCOPHEROL ACETATE, DL-, THIAMINE MONONITRATE, RIBOFLAVIN, NIACINAMIDE, PYRIDOXINE HYDROCHLORIDE, FOLIC ACID, CYANOCOBALAMIN, CALCIUM CARBONATE, FERROUS FUMARATE, ZINC OXIDE, AND CUPRIC OXIDE 2000; 2000; 120; 400; 22; 1.84; 3; 20; 10; 1; 12; 200; 27; 25; 2 [IU]/1; [IU]/1; MG/1; [IU]/1; MG/1; MG/1; MG/1; MG/1; MG/1; MG/1; UG/1; MG/1; MG/1; MG/1; MG/1
1 TABLET ORAL DAILY
Qty: 30 TABLET | Refills: 12 | Status: SHIPPED | OUTPATIENT
Start: 2020-08-14 | End: 2022-03-02

## 2020-08-14 NOTE — PROGRESS NOTES
GYN Visit    2020    Patient: Анна Lopez          MR#:[id number]      Chief Complaint   Patient presents with   • Possible Pregnancy     U/S with Elsy.  No pap in epic.  Pregnancy confirmation.  LMP 20 ??  ~7weeks       History of Present Illness    30 y.o. female  who presents for pregnancy confirmation  See US- viable IUP  Nausea and vomiting but mild and intermittent  Lives with BF , same father as her 3 and 7 yo  Low risk history  Due for pap     Working 2 jobs, one is from home        Patient's last menstrual period was 2020.    ________________________________________  Patient Active Problem List   Diagnosis   • Headache   • Seizure disorder (CMS/HCC)   • Gastroesophageal reflux disease without esophagitis   • 32 weeks gestation of pregnancy   • Pregnancy   • Normal labor       Past Medical History:   Diagnosis Date   • Chronic kidney disease     pyelonephritis 1st trimester   • Seizures (CMS/HCC)     last seizure 4yrs ago; no meds   • Urinary tract infection     took last dose of antibiotics 2016; patient stated it was for bladder infection   • Vaginal delivery 2013       Past Surgical History:   Procedure Laterality Date   • WISDOM TOOTH EXTRACTION         Social History     Tobacco Use   Smoking Status Former Smoker   • Types: Cigarettes   • Last attempt to quit: 2016   • Years since quittin.3   Smokeless Tobacco Never Used       has a current medication list which includes the following prescription(s): pnv prenatal plus multivitamin and promethazine.  ________________________________________    Current contraception: none      The following portions of the patient's history were reviewed and updated as appropriate: allergies, current medications, past family history, past medical history, past social history, past surgical history and problem list.    Review of Systems   Constitutional: Positive for fatigue. Negative for chills and fever.  "  Gastrointestinal: Positive for nausea and vomiting. Negative for abdominal pain.   Genitourinary: Negative for pelvic pain and vaginal bleeding.   Neurological: Negative for light-headedness and headaches.   Psychiatric/Behavioral: Negative for dysphoric mood.   All other systems reviewed and are negative.      Pertinent items are noted in HPI.     Objective   Physical Exam    /70   Ht 175.3 cm (69\")   Wt 68.5 kg (151 lb)   LMP 2020   Breastfeeding No   BMI 22.30 kg/m²    BP Readings from Last 3 Encounters:   20 114/70   17 110/70   17 115/90      Wt Readings from Last 3 Encounters:   20 68.5 kg (151 lb)   17 65.8 kg (145 lb)   17 61.2 kg (135 lb)      BMI: Estimated body mass index is 22.3 kg/m² as calculated from the following:    Height as of this encounter: 175.3 cm (69\").    Weight as of this encounter: 68.5 kg (151 lb).    Lungs: non labored breathing, no wheezing or tachpnea  Extremities: extremities normal, atraumatic, no cyanosis or edema  Skin: Skin color, texture, turgor normal. No rashes or lesions  Neurologic: Grossly normal  General:   alert, appears stated age and cooperative   Abdomen: soft, non-tender, without masses or organomegaly       Vulva: normal   Vagina: normal mucosa   Cervix: no cervical motion tenderness and no lesions   Uterus: size consistent with 7 weeks   Adnexa: no mass, fullness, tenderness     Assessment:      Анна was seen today for possible pregnancy.    Diagnoses and all orders for this visit:    Possible pregnancy, not confirmed     screening encounter  -     OB Panel With HIV  -     Varicella Zoster Antibody, IgG    Prenatal care, subsequent pregnancy, first trimester  -     OB Panel With HIV  -     Varicella Zoster Antibody, IgG    Screen for STD (sexually transmitted disease)  -     OB Panel With HIV    Encounter for drug screening    Encounter for screening for HIV  -     OB Panel With HIV    7 weeks " gestation of pregnancy  -     OB Panel With HIV  -     Varicella Zoster Antibody, IgG    Encounter for confirmation of pregnancy test result with physical examination    Other orders  -     Prenatal Vit-Fe Fumarate-FA (PNV PRENATAL PLUS MULTIVITAMIN) 27-1 MG tablet; Take 1 tablet by mouth Daily.      PAP at follow up  NIPS at fu

## 2020-08-15 LAB
ABO GROUP BLD: NORMAL
BASOPHILS # BLD AUTO: 0 X10E3/UL (ref 0–0.2)
BASOPHILS NFR BLD AUTO: 0 %
BLD GP AB SCN SERPL QL: NEGATIVE
EOSINOPHIL # BLD AUTO: 0 X10E3/UL (ref 0–0.4)
EOSINOPHIL NFR BLD AUTO: 0 %
ERYTHROCYTE [DISTWIDTH] IN BLOOD BY AUTOMATED COUNT: 12.4 % (ref 11.7–15.4)
HBV SURFACE AG SERPL QL IA: NEGATIVE
HCT VFR BLD AUTO: 39 % (ref 34–46.6)
HCV AB S/CO SERPL IA: 0.1 S/CO RATIO (ref 0–0.9)
HGB BLD-MCNC: 13.6 G/DL (ref 11.1–15.9)
HIV 1+2 AB+HIV1 P24 AG SERPL QL IA: NON REACTIVE
IMM GRANULOCYTES # BLD AUTO: 0 X10E3/UL (ref 0–0.1)
IMM GRANULOCYTES NFR BLD AUTO: 0 %
LYMPHOCYTES # BLD AUTO: 1.8 X10E3/UL (ref 0.7–3.1)
LYMPHOCYTES NFR BLD AUTO: 23 %
MCH RBC QN AUTO: 30.9 PG (ref 26.6–33)
MCHC RBC AUTO-ENTMCNC: 34.9 G/DL (ref 31.5–35.7)
MCV RBC AUTO: 89 FL (ref 79–97)
MONOCYTES # BLD AUTO: 0.6 X10E3/UL (ref 0.1–0.9)
MONOCYTES NFR BLD AUTO: 8 %
NEUTROPHILS # BLD AUTO: 5.3 X10E3/UL (ref 1.4–7)
NEUTROPHILS NFR BLD AUTO: 69 %
PLATELET # BLD AUTO: 256 X10E3/UL (ref 150–450)
RBC # BLD AUTO: 4.4 X10E6/UL (ref 3.77–5.28)
RH BLD: POSITIVE
RPR SER QL: NON REACTIVE
RUBV IGG SERPL IA-ACNC: 9.71 INDEX
VZV IGG SER IA-ACNC: >4000 INDEX
WBC # BLD AUTO: 7.8 X10E3/UL (ref 3.4–10.8)

## 2020-09-04 ENCOUNTER — TELEPHONE (OUTPATIENT)
Dept: OBSTETRICS AND GYNECOLOGY | Age: 30
End: 2020-09-04

## 2020-09-04 NOTE — TELEPHONE ENCOUNTER
Patient called and states she is approximately 11 weeks pregnant and her employer is requiring a TB skin test.  She is wanting to make sure this is ok?

## 2020-09-10 ENCOUNTER — ROUTINE PRENATAL (OUTPATIENT)
Dept: OBSTETRICS AND GYNECOLOGY | Age: 30
End: 2020-09-10

## 2020-09-10 VITALS — SYSTOLIC BLOOD PRESSURE: 114 MMHG | BODY MASS INDEX: 21.41 KG/M2 | WEIGHT: 145 LBS | DIASTOLIC BLOOD PRESSURE: 78 MMHG

## 2020-09-10 DIAGNOSIS — Z13.89 SCREENING FOR HEMATURIA OR PROTEINURIA: ICD-10-CM

## 2020-09-10 DIAGNOSIS — Z3A.11 11 WEEKS GESTATION OF PREGNANCY: ICD-10-CM

## 2020-09-10 DIAGNOSIS — Z34.81 PRENATAL CARE, SUBSEQUENT PREGNANCY, FIRST TRIMESTER: Primary | ICD-10-CM

## 2020-09-10 DIAGNOSIS — Z23 NEED FOR INFLUENZA VACCINATION: ICD-10-CM

## 2020-09-10 LAB
GLUCOSE UR STRIP-MCNC: NEGATIVE MG/DL
PROT UR STRIP-MCNC: NEGATIVE MG/DL

## 2020-09-10 PROCEDURE — 99213 OFFICE O/P EST LOW 20 MIN: CPT | Performed by: OBSTETRICS & GYNECOLOGY

## 2020-09-10 PROCEDURE — 90471 IMMUNIZATION ADMIN: CPT | Performed by: OBSTETRICS & GYNECOLOGY

## 2020-09-10 PROCEDURE — 90686 IIV4 VACC NO PRSV 0.5 ML IM: CPT | Performed by: OBSTETRICS & GYNECOLOGY

## 2020-09-10 RX ORDER — ONDANSETRON 4 MG/1
4 TABLET, FILM COATED ORAL DAILY PRN
Qty: 30 TABLET | Refills: 1 | Status: SHIPPED | OUTPATIENT
Start: 2020-09-10 | End: 2020-11-03 | Stop reason: SDUPTHER

## 2020-09-10 NOTE — PROGRESS NOTES
Still with N/V  Will try some zofran  NIPS today  AFP at fu    Chief Complaint   Patient presents with   • Routine Prenatal Visit     11w 5d.  Nateral today.  Prenatal urine labs today.  Flu vac today.  Weight loss.        HPI:  Pt presents for routine prenatal visit    ROS:  No fever or chills, no nausea or vomiting, no contractions, no leg pain, no LE edema, no leaking fluid, no bleeding, no headache, no dysuria  All other systems reviewed and negative    Exam:  See flow sheet  General:  Alert and oriented and no distress  Neck: no lymphadenopathy or thyromegaly  Lungs: non - labored breathing  Abd:  See flow sheet, fundus nontender  Ext: see flow sheet, non-tender bilateral , no lesions  Neuro: grossly normal    Assessment:/ PLAN:    Анна was seen today for routine prenatal visit.    Diagnoses and all orders for this visit:    Prenatal care, subsequent pregnancy, first trimester  -     POC Urinalysis Dipstick    11 weeks gestation of pregnancy  -     POC Urinalysis Dipstick    Screening for hematuria or proteinuria  -     POC Urinalysis Dipstick    Need for influenza vaccination    Other orders  -     ondansetron (Zofran) 4 MG tablet; Take 1 tablet by mouth Daily As Needed for Nausea or Vomiting.

## 2020-09-21 ENCOUNTER — TELEPHONE (OUTPATIENT)
Dept: OBSTETRICS AND GYNECOLOGY | Age: 30
End: 2020-09-21

## 2020-09-25 ENCOUNTER — TELEPHONE (OUTPATIENT)
Dept: OBSTETRICS AND GYNECOLOGY | Age: 30
End: 2020-09-25

## 2020-09-25 NOTE — TELEPHONE ENCOUNTER
(Cody stokes) Pt called, needs a letter stating she is 13 weeks pregnant and that it is ok for her to get a TB test done for work.  Pt is currently @ the doctors office and needs faxed  ASAP.    [phone]       Please fax letter to [phone].

## 2020-09-26 ENCOUNTER — TELEPHONE (OUTPATIENT)
Dept: OBSTETRICS AND GYNECOLOGY | Age: 30
End: 2020-09-26

## 2020-09-26 NOTE — TELEPHONE ENCOUNTER
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

## 2020-10-12 ENCOUNTER — ROUTINE PRENATAL (OUTPATIENT)
Dept: OBSTETRICS AND GYNECOLOGY | Age: 30
End: 2020-10-12

## 2020-10-12 VITALS — WEIGHT: 151 LBS | BODY MASS INDEX: 22.3 KG/M2 | DIASTOLIC BLOOD PRESSURE: 74 MMHG | SYSTOLIC BLOOD PRESSURE: 104 MMHG

## 2020-10-12 DIAGNOSIS — Z12.4 SCREENING FOR CERVICAL CANCER: ICD-10-CM

## 2020-10-12 DIAGNOSIS — Z36.9 ANTENATAL SCREENING ENCOUNTER: ICD-10-CM

## 2020-10-12 DIAGNOSIS — Z3A.16 16 WEEKS GESTATION OF PREGNANCY: ICD-10-CM

## 2020-10-12 DIAGNOSIS — Z34.82 PRENATAL CARE, SUBSEQUENT PREGNANCY, SECOND TRIMESTER: Primary | ICD-10-CM

## 2020-10-12 DIAGNOSIS — Z13.89 SCREENING FOR HEMATURIA OR PROTEINURIA: ICD-10-CM

## 2020-10-12 LAB
GLUCOSE UR STRIP-MCNC: NEGATIVE MG/DL
PROT UR STRIP-MCNC: NEGATIVE MG/DL

## 2020-10-12 PROCEDURE — 99213 OFFICE O/P EST LOW 20 MIN: CPT | Performed by: OBSTETRICS & GYNECOLOGY

## 2020-10-12 NOTE — PROGRESS NOTES
Feeling better, AFP today  Pap done, discussed intermediate allele for fragile X, encouraged to call free genetic counseling through [facility], anatomy at   Its a boy!  Chief Complaint   Patient presents with   • Routine Prenatal Visit     16w 2d.  afp today. Cc: constipation and nausea and vomitting. Drinking prune juice for constipation.  Fetal sex = male       HPI:  Pt presents for routine prenatal visit    ROS:  No fever or chills, no nausea or vomiting, no contractions, no leg pain, no LE edema, no leaking fluid, no bleeding, no headache, no dysuria  All other systems reviewed and negative    Exam:  See flow sheet  General:  Alert and oriented and no distress  Neck: no lymphadenopathy or thyromegaly  Lungs: non - labored breathing  Abd:  See flow sheet, fundus nontender  Ext: see flow sheet, non-tender bilateral , no lesions  Neuro: grossly normal    Assessment:/ PLAN:    Анна was seen today for routine prenatal visit.    Diagnoses and all orders for this visit:    Prenatal care, subsequent pregnancy, second trimester  -     Alpha Fetoprotein, Maternal  -     POC Urinalysis Dipstick    16 weeks gestation of pregnancy  -     Alpha Fetoprotein, Maternal  -     POC Urinalysis Dipstick    Screening for hematuria or proteinuria  -     POC Urinalysis Dipstick     screening encounter  -     Alpha Fetoprotein, Maternal    Screening for cervical cancer  -     Pap IG, Rfx HPV ASCU

## 2020-10-14 LAB
CONV .: NORMAL
CYTOLOGIST CVX/VAG CYTO: NORMAL
CYTOLOGY CVX/VAG DOC CYTO: NORMAL
CYTOLOGY CVX/VAG DOC THIN PREP: NORMAL
DX ICD CODE: NORMAL
HIV 1 & 2 AB SER-IMP: NORMAL
OTHER STN SPEC: NORMAL
STAT OF ADQ CVX/VAG CYTO-IMP: NORMAL

## 2020-10-15 LAB
AFP ADJ MOM SERPL: 1.58
AFP INTERP SERPL-IMP: NORMAL
AFP INTERP SERPL-IMP: NORMAL
AFP SERPL-MCNC: 61.2 NG/ML
AGE AT DELIVERY: 28.6 YR
GA METHOD: NORMAL
GA: 16.3 WEEKS
IDDM PATIENT QL: NO
LABORATORY COMMENT REPORT: NORMAL
MULTIPLE PREGNANCY: NO
NEURAL TUBE DEFECT RISK FETUS: 4424 %
RESULT: NORMAL

## 2020-10-26 ENCOUNTER — TELEPHONE (OUTPATIENT)
Dept: OBSTETRICS AND GYNECOLOGY | Age: 30
End: 2020-10-26

## 2020-10-26 NOTE — TELEPHONE ENCOUNTER
Patient is currently 18 weeks pregnant.  She is complaining with pelvic pressure and pain when she is walking or using the restroom.  Please advise.

## 2020-10-27 ENCOUNTER — ROUTINE PRENATAL (OUTPATIENT)
Dept: OBSTETRICS AND GYNECOLOGY | Age: 30
End: 2020-10-27

## 2020-10-27 VITALS — SYSTOLIC BLOOD PRESSURE: 108 MMHG | DIASTOLIC BLOOD PRESSURE: 64 MMHG | BODY MASS INDEX: 23.57 KG/M2 | WEIGHT: 155 LBS

## 2020-10-27 DIAGNOSIS — R10.2 FEELING PELVIC PRESSURE DURING PREGNANCY, ANTEPARTUM: Primary | ICD-10-CM

## 2020-10-27 DIAGNOSIS — O26.899 FEELING PELVIC PRESSURE DURING PREGNANCY, ANTEPARTUM: Primary | ICD-10-CM

## 2020-10-27 DIAGNOSIS — R30.0 DYSURIA: ICD-10-CM

## 2020-10-27 PROCEDURE — 99213 OFFICE O/P EST LOW 20 MIN: CPT | Performed by: NURSE PRACTITIONER

## 2020-10-27 RX ORDER — AMOXICILLIN AND CLAVULANATE POTASSIUM 500; 125 MG/1; MG/1
1 TABLET, FILM COATED ORAL 2 TIMES DAILY
Qty: 14 TABLET | Refills: 0 | Status: SHIPPED | OUTPATIENT
Start: 2020-10-27 | End: 2020-11-03

## 2020-10-27 NOTE — PROGRESS NOTES
HPI: 30 y.o.  at 18w3d presents today with chief complaint of pelvic pressure/pain  She has intermittent burning with urination. Denies frequency. Notices small amount of discharge but denies burning, itching or odor.   + FM, denies LOF, bleeding or ctx's.      Vitals:    10/27/20 0952   BP: 108/64   Weight: 70.3 kg (155 lb)       ROS:  GI:  Negative  : Pelvic pressure  Pulmonary: Negative     A/P  1. Intrauterine pregnancy at 18w3d   2. Pregnancy Risk:  NORMAL    Diagnoses and all orders for this visit:    1. Feeling pelvic pressure during pregnancy, antepartum (Primary)    2. Dysuria  -     NuSwab VG+ - Swab, Vagina    Other orders  -     amoxicillin-clavulanate (Augmentin) 500-125 MG per tablet; Take 1 tablet by mouth 2 (two) times a day for 7 days.  Dispense: 14 tablet; Refill: 0    Treat for UTI  Urine culture sent   Nuswab done  Warning signs including worsening pain, vaginal bleeding or LOF were discussed, go to [facility] if any occur.  Keep scheduled follow up  -----------------------  PLAN:   No follow-ups on file.      India Hansen, MAURI  10/27/2020 10:09 EDT

## 2020-10-28 LAB
A VAGINAE DNA VAG QL NAA+PROBE: ABNORMAL SCORE
BVAB2 DNA VAG QL NAA+PROBE: ABNORMAL SCORE
C ALBICANS DNA VAG QL NAA+PROBE: NEGATIVE
C GLABRATA DNA VAG QL NAA+PROBE: NEGATIVE
C TRACH DNA VAG QL NAA+PROBE: POSITIVE
MEGA1 DNA VAG QL NAA+PROBE: ABNORMAL SCORE
N GONORRHOEA DNA VAG QL NAA+PROBE: NEGATIVE
T VAGINALIS DNA VAG QL NAA+PROBE: POSITIVE

## 2020-10-29 LAB
BACTERIA UR CULT: ABNORMAL
BACTERIA UR CULT: ABNORMAL
OTHER ANTIBIOTIC SUSC ISLT: ABNORMAL

## 2020-10-29 RX ORDER — METRONIDAZOLE 500 MG/1
500 TABLET ORAL 2 TIMES DAILY
Qty: 14 TABLET | Refills: 0 | Status: SHIPPED | OUTPATIENT
Start: 2020-10-29 | End: 2020-11-05

## 2020-10-29 RX ORDER — AZITHROMYCIN 500 MG/1
TABLET, FILM COATED ORAL
Qty: 2 TABLET | Refills: 0 | Status: SHIPPED | OUTPATIENT
Start: 2020-10-29 | End: 2020-12-10

## 2020-11-03 RX ORDER — ONDANSETRON 4 MG/1
4 TABLET, FILM COATED ORAL DAILY PRN
Qty: 30 TABLET | Refills: 1 | Status: SHIPPED | OUTPATIENT
Start: 2020-11-03 | End: 2020-11-09

## 2020-11-03 NOTE — TELEPHONE ENCOUNTER
(Cody pt) Pt is 19 weeks pregnant.  Needs refill of ondansetron (Zofran) 4 MG tablet.    Pharmacy verified.    [phone]

## 2020-11-09 ENCOUNTER — ROUTINE PRENATAL (OUTPATIENT)
Dept: OBSTETRICS AND GYNECOLOGY | Age: 30
End: 2020-11-09

## 2020-11-09 ENCOUNTER — PROCEDURE VISIT (OUTPATIENT)
Dept: OBSTETRICS AND GYNECOLOGY | Age: 30
End: 2020-11-09

## 2020-11-09 VITALS — BODY MASS INDEX: 23.57 KG/M2 | DIASTOLIC BLOOD PRESSURE: 78 MMHG | WEIGHT: 155 LBS | SYSTOLIC BLOOD PRESSURE: 118 MMHG

## 2020-11-09 DIAGNOSIS — Z36.89 SCREENING, ANTENATAL, FOR FETAL ANATOMIC SURVEY: ICD-10-CM

## 2020-11-09 DIAGNOSIS — Z02.83 ENCOUNTER FOR DRUG SCREENING: ICD-10-CM

## 2020-11-09 DIAGNOSIS — Z11.3 SCREEN FOR STD (SEXUALLY TRANSMITTED DISEASE): ICD-10-CM

## 2020-11-09 DIAGNOSIS — Z34.82 PRENATAL CARE, SUBSEQUENT PREGNANCY, SECOND TRIMESTER: Primary | ICD-10-CM

## 2020-11-09 DIAGNOSIS — Z13.89 SCREENING FOR HEMATURIA OR PROTEINURIA: ICD-10-CM

## 2020-11-09 DIAGNOSIS — Z36.9 ANTENATAL SCREENING ENCOUNTER: ICD-10-CM

## 2020-11-09 DIAGNOSIS — Z3A.20 20 WEEKS GESTATION OF PREGNANCY: ICD-10-CM

## 2020-11-09 DIAGNOSIS — Z36.86 ENCOUNTER FOR ANTENATAL SCREENING FOR CERVICAL LENGTH: Primary | ICD-10-CM

## 2020-11-09 LAB
GLUCOSE UR STRIP-MCNC: NEGATIVE MG/DL
PROT UR STRIP-MCNC: NEGATIVE MG/DL

## 2020-11-09 PROCEDURE — 99213 OFFICE O/P EST LOW 20 MIN: CPT | Performed by: OBSTETRICS & GYNECOLOGY

## 2020-11-09 PROCEDURE — 76805 OB US >/= 14 WKS SNGL FETUS: CPT | Performed by: OBSTETRICS & GYNECOLOGY

## 2020-11-09 PROCEDURE — 76817 TRANSVAGINAL US OBSTETRIC: CPT | Performed by: OBSTETRICS & GYNECOLOGY

## 2020-11-09 RX ORDER — ONDANSETRON 4 MG/1
4 TABLET, FILM COATED ORAL DAILY PRN
Qty: 30 TABLET | Refills: 1 | Status: SHIPPED | OUTPATIENT
Start: 2020-11-09 | End: 2020-12-10 | Stop reason: SDUPTHER

## 2020-11-09 NOTE — PROGRESS NOTES
Pt feeling well, mild nausea   Almost done with antibiotics  Will repeat culture and urine STD  See US report, normal anatomy and cw dates  Some heart views not seen - complete anatomy at fu with 1 hr GCT    Chief Complaint   Patient presents with   • Routine Prenatal Visit     U/S with Tad.  Anatomy scan.  Nausea is better.  Feet are swelling.        HPI:  Pt presents for routine prenatal visit    ROS:  No fever or chills, no nausea or vomiting, no contractions, no leg pain, no LE edema, no leaking fluid, no bleeding, no headache, no dysuria  All other systems reviewed and negative    Exam:  See flow sheet  General:  Alert and oriented and no distress  Neck: no lymphadenopathy or thyromegaly  Lungs: non - labored breathing  Abd:  See flow sheet, fundus nontender  Ext: see flow sheet, non-tender bilateral , no lesions  Neuro: grossly normal    Assessment:/ PLAN:    Diagnoses and all orders for this visit:    1. Prenatal care, subsequent pregnancy, second trimester (Primary)  -     POC Urinalysis Dipstick    2. 20 weeks gestation of pregnancy  -     POC Urinalysis Dipstick    3. Screening for hematuria or proteinuria  -     POC Urinalysis Dipstick    4. Screening, , for fetal anatomic survey

## 2020-11-11 LAB
AMPHETAMINES UR QL SCN: NEGATIVE NG/ML
BACTERIA UR CULT: NO GROWTH
BACTERIA UR CULT: NORMAL
BARBITURATES UR QL SCN: NEGATIVE NG/ML
BENZODIAZ UR QL: NEGATIVE NG/ML
BZE UR QL: NEGATIVE NG/ML
C TRACH RRNA SPEC QL NAA+PROBE: NEGATIVE
CANNABINOIDS UR QL SCN: NEGATIVE NG/ML
METHADONE UR QL SCN: NEGATIVE NG/ML
N GONORRHOEA RRNA SPEC QL NAA+PROBE: NEGATIVE
OPIATES UR QL: NEGATIVE NG/ML
PCP UR QL: NEGATIVE NG/ML
PROPOXYPH UR QL SCN: NEGATIVE NG/ML

## 2020-12-10 ENCOUNTER — PROCEDURE VISIT (OUTPATIENT)
Dept: OBSTETRICS AND GYNECOLOGY | Age: 30
End: 2020-12-10

## 2020-12-10 ENCOUNTER — ROUTINE PRENATAL (OUTPATIENT)
Dept: OBSTETRICS AND GYNECOLOGY | Age: 30
End: 2020-12-10

## 2020-12-10 VITALS — DIASTOLIC BLOOD PRESSURE: 72 MMHG | WEIGHT: 158 LBS | SYSTOLIC BLOOD PRESSURE: 102 MMHG | BODY MASS INDEX: 24.02 KG/M2

## 2020-12-10 DIAGNOSIS — IMO0002 EVALUATE ANATOMY NOT SEEN ON PRIOR SONOGRAM: Primary | ICD-10-CM

## 2020-12-10 DIAGNOSIS — Z13.89 SCREENING FOR HEMATURIA OR PROTEINURIA: ICD-10-CM

## 2020-12-10 DIAGNOSIS — Z34.82 PRENATAL CARE, SUBSEQUENT PREGNANCY, SECOND TRIMESTER: Primary | ICD-10-CM

## 2020-12-10 DIAGNOSIS — Z36.9 ANTENATAL SCREENING ENCOUNTER: ICD-10-CM

## 2020-12-10 DIAGNOSIS — Z13.1 SCREENING FOR DIABETES MELLITUS: ICD-10-CM

## 2020-12-10 DIAGNOSIS — Z3A.24 24 WEEKS GESTATION OF PREGNANCY: ICD-10-CM

## 2020-12-10 LAB
BASOPHILS # BLD AUTO: 0.01 10*3/MM3 (ref 0–0.2)
BASOPHILS NFR BLD AUTO: 0.1 % (ref 0–1.5)
EOSINOPHIL # BLD AUTO: 0.03 10*3/MM3 (ref 0–0.4)
EOSINOPHIL NFR BLD AUTO: 0.4 % (ref 0.3–6.2)
ERYTHROCYTE [DISTWIDTH] IN BLOOD BY AUTOMATED COUNT: 11.8 % (ref 12.3–15.4)
GLUCOSE 1H P 50 G GLC PO SERPL-MCNC: 187 MG/DL (ref 65–139)
GLUCOSE UR STRIP-MCNC: NEGATIVE MG/DL
HCT VFR BLD AUTO: 34.2 % (ref 34–46.6)
HGB BLD-MCNC: 12.1 G/DL (ref 12–15.9)
IMM GRANULOCYTES # BLD AUTO: 0.05 10*3/MM3 (ref 0–0.05)
IMM GRANULOCYTES NFR BLD AUTO: 0.6 % (ref 0–0.5)
LYMPHOCYTES # BLD AUTO: 1.5 10*3/MM3 (ref 0.7–3.1)
LYMPHOCYTES NFR BLD AUTO: 17.6 % (ref 19.6–45.3)
MCH RBC QN AUTO: 31.8 PG (ref 26.6–33)
MCHC RBC AUTO-ENTMCNC: 35.4 G/DL (ref 31.5–35.7)
MCV RBC AUTO: 90 FL (ref 79–97)
MONOCYTES # BLD AUTO: 0.39 10*3/MM3 (ref 0.1–0.9)
MONOCYTES NFR BLD AUTO: 4.6 % (ref 5–12)
NEUTROPHILS # BLD AUTO: 6.55 10*3/MM3 (ref 1.7–7)
NEUTROPHILS NFR BLD AUTO: 76.7 % (ref 42.7–76)
NRBC BLD AUTO-RTO: 0 /100 WBC (ref 0–0.2)
PLATELET # BLD AUTO: 195 10*3/MM3 (ref 140–450)
RBC # BLD AUTO: 3.8 10*6/MM3 (ref 3.77–5.28)
RBC # UR STRIP: NEGATIVE /UL
WBC # BLD AUTO: 8.53 10*3/MM3 (ref 3.4–10.8)

## 2020-12-10 PROCEDURE — 99213 OFFICE O/P EST LOW 20 MIN: CPT | Performed by: OBSTETRICS & GYNECOLOGY

## 2020-12-10 PROCEDURE — 76816 OB US FOLLOW-UP PER FETUS: CPT | Performed by: OBSTETRICS & GYNECOLOGY

## 2020-12-10 RX ORDER — ONDANSETRON 4 MG/1
4 TABLET, FILM COATED ORAL DAILY PRN
Qty: 30 TABLET | Refills: 1 | Status: SHIPPED | OUTPATIENT
Start: 2020-12-10 | End: 2021-02-18

## 2020-12-10 NOTE — PROGRESS NOTES
Pt still with N/V, mostly in am, 1 hr GCT today  US for heart views, anatomy now complete, normal  Follow up 4 weeks    Chief Complaint   Patient presents with   • Routine Prenatal Visit     Anatomy Scan with DW today.  GTT and fax breast pump order. Still having nausea and vomitting in the AM.  Needs refill of zofran.        HPI:  Pt presents for routine prenatal visit    ROS:  No fever or chills, no nausea or vomiting, no contractions, no leg pain, no LE edema, no leaking fluid, no bleeding, no headache, no dysuria  All other systems reviewed and negative    Exam:  See flow sheet  General:  Alert and oriented and no distress  Neck: no lymphadenopathy or thyromegaly  Lungs: non - labored breathing  Abd:  See flow sheet, fundus nontender  Ext: see flow sheet, non-tender bilateral , no lesions  Neuro: grossly normal    Assessment:/ PLAN:    Diagnoses and all orders for this visit:    1. Prenatal care, subsequent pregnancy, second trimester (Primary)  -     Gestational Screen 1 Hr ([facility])  -     CBC & Differential  -     POC Urinalysis Dipstick    2. 24 weeks gestation of pregnancy  -     Gestational Screen 1 Hr ([facility])  -     CBC & Differential  -     POC Urinalysis Dipstick    3. Screening for hematuria or proteinuria  -     POC Urinalysis Dipstick    4. Screening for diabetes mellitus  -     Gestational Screen 1 Hr ([facility])    5.  screening encounter  -     Gestational Screen 1 Hr ([facility])  -     CBC & Differential  -     POC Urinalysis Dipstick    Other orders  -     ondansetron (ZOFRAN) 4 MG tablet; Take 1 tablet by mouth Daily As Needed for Nausea or Vomiting.  Dispense: 30 tablet; Refill: 1

## 2020-12-14 ENCOUNTER — LAB (OUTPATIENT)
Dept: OBSTETRICS AND GYNECOLOGY | Age: 30
End: 2020-12-14

## 2020-12-14 ENCOUNTER — TELEPHONE (OUTPATIENT)
Dept: OBSTETRICS AND GYNECOLOGY | Age: 30
End: 2020-12-14

## 2020-12-14 DIAGNOSIS — Z36.9 ANTENATAL SCREENING ENCOUNTER: ICD-10-CM

## 2020-12-14 DIAGNOSIS — O99.810 ABNORMAL GLUCOSE IN PREGNANCY, ANTEPARTUM: ICD-10-CM

## 2020-12-14 DIAGNOSIS — Z13.1 SCREENING FOR DIABETES MELLITUS: Primary | ICD-10-CM

## 2020-12-14 NOTE — TELEPHONE ENCOUNTER
Patient came into the office earlier today for an appointment and needs an  doctors note emailed over to her, please advise.       Email: [email or web address]

## 2020-12-15 LAB
GLUCOSE 1H P 100 G GLC PO SERPL-MCNC: 133 MG/DL (ref 65–179)
GLUCOSE 2H P 100 G GLC PO SERPL-MCNC: 73 MG/DL (ref 65–154)
GLUCOSE 3H P 100 G GLC PO SERPL-MCNC: 98 MG/DL (ref 65–139)
GLUCOSE P FAST SERPL-MCNC: 82 MG/DL (ref 65–94)
Lab: NORMAL

## 2021-01-07 ENCOUNTER — ROUTINE PRENATAL (OUTPATIENT)
Dept: OBSTETRICS AND GYNECOLOGY | Age: 31
End: 2021-01-07

## 2021-01-07 VITALS — BODY MASS INDEX: 24.33 KG/M2 | SYSTOLIC BLOOD PRESSURE: 110 MMHG | DIASTOLIC BLOOD PRESSURE: 72 MMHG | WEIGHT: 160 LBS

## 2021-01-07 DIAGNOSIS — O26.893 VAGINAL DISCHARGE DURING PREGNANCY IN THIRD TRIMESTER: ICD-10-CM

## 2021-01-07 DIAGNOSIS — Z3A.28 28 WEEKS GESTATION OF PREGNANCY: ICD-10-CM

## 2021-01-07 DIAGNOSIS — N89.8 VAGINAL DISCHARGE DURING PREGNANCY IN THIRD TRIMESTER: ICD-10-CM

## 2021-01-07 DIAGNOSIS — Z34.83 PRENATAL CARE, SUBSEQUENT PREGNANCY, THIRD TRIMESTER: Primary | ICD-10-CM

## 2021-01-07 DIAGNOSIS — Z13.89 SCREENING FOR HEMATURIA OR PROTEINURIA: ICD-10-CM

## 2021-01-07 LAB
GLUCOSE UR STRIP-MCNC: NEGATIVE MG/DL
PROT UR STRIP-MCNC: NEGATIVE MG/DL

## 2021-01-07 PROCEDURE — 99213 OFFICE O/P EST LOW 20 MIN: CPT | Performed by: OBSTETRICS & GYNECOLOGY

## 2021-01-07 NOTE — PROGRESS NOTES
Pt feels ok, just tired  Some discharge, seems normal, history of BV, trich and chlamydia beginning of pregnancy, will screen again today    Growth at fu    Chief Complaint   Patient presents with   • Routine Prenatal Visit     Cc:  Reports GFM.  Still has nausea and vomitting but it is better.        HPI:  Pt presents for routine prenatal visit    ROS:  No fever or chills, no nausea or vomiting, no contractions, no leg pain, no LE edema, no leaking fluid, no bleeding, no headache, no dysuria  All other systems reviewed and negative    Exam:  See flow sheet  General:  Alert and oriented and no distress  Neck: no lymphadenopathy or thyromegaly  Lungs: non - labored breathing  Abd:  See flow sheet, fundus nontender  Ext: see flow sheet, non-tender bilateral , no lesions  Neuro: grossly normal  Spec, normal white discharge, cervix closed, normal EG    Assessment:/ PLAN:    Diagnoses and all orders for this visit:    1. Prenatal care, subsequent pregnancy, third trimester (Primary)  -     Cancel: POC Urinalysis Dipstick  -     POC Urinalysis Dipstick    2. 28 weeks gestation of pregnancy  -     Cancel: POC Urinalysis Dipstick  -     POC Urinalysis Dipstick    3. Screening for hematuria or proteinuria  -     Cancel: POC Urinalysis Dipstick  -     POC Urinalysis Dipstick    4. Vaginal discharge during pregnancy in third trimester  -     NuSwab VG+ - Swab, Vagina

## 2021-01-11 LAB
A VAGINAE DNA VAG QL NAA+PROBE: NORMAL SCORE
BVAB2 DNA VAG QL NAA+PROBE: NORMAL SCORE
C ALBICANS DNA VAG QL NAA+PROBE: NEGATIVE
C GLABRATA DNA VAG QL NAA+PROBE: NEGATIVE
C TRACH DNA VAG QL NAA+PROBE: NEGATIVE
MEGA1 DNA VAG QL NAA+PROBE: NORMAL SCORE
N GONORRHOEA DNA VAG QL NAA+PROBE: NEGATIVE
T VAGINALIS DNA VAG QL NAA+PROBE: NEGATIVE

## 2021-01-19 ENCOUNTER — HOSPITAL ENCOUNTER (EMERGENCY)
Facility: HOSPITAL | Age: 31
Discharge: HOME | End: 2021-01-19
Attending: OBSTETRICS & GYNECOLOGY | Admitting: OBSTETRICS & GYNECOLOGY

## 2021-01-19 ENCOUNTER — TELEPHONE (OUTPATIENT)
Dept: OBSTETRICS AND GYNECOLOGY | Age: 31
End: 2021-01-19

## 2021-01-19 VITALS
SYSTOLIC BLOOD PRESSURE: 120 MMHG | HEART RATE: 70 BPM | RESPIRATION RATE: 16 BRPM | HEIGHT: 68 IN | DIASTOLIC BLOOD PRESSURE: 70 MMHG | TEMPERATURE: 98.7 F | WEIGHT: 161 LBS | BODY MASS INDEX: 24.4 KG/M2

## 2021-01-19 LAB
BILIRUB UR QL STRIP: NEGATIVE
CLARITY UR: CLEAR
COLOR UR: ABNORMAL
GLUCOSE UR STRIP-MCNC: NEGATIVE MG/DL
HGB UR QL STRIP.AUTO: NEGATIVE
KETONES UR QL STRIP: ABNORMAL
LEUKOCYTE ESTERASE UR QL STRIP.AUTO: NEGATIVE
NITRITE UR QL STRIP: NEGATIVE
PH UR STRIP.AUTO: 6 [PH] (ref 5–8)
PROT UR QL STRIP: NEGATIVE
SP GR UR STRIP: 1.02 (ref 1–1.03)
UROBILINOGEN UR QL STRIP: ABNORMAL

## 2021-01-19 PROCEDURE — 96360 HYDRATION IV INFUSION INIT: CPT | Performed by: OBSTETRICS & GYNECOLOGY

## 2021-01-19 PROCEDURE — 99284 EMERGENCY DEPT VISIT MOD MDM: CPT | Performed by: OBSTETRICS & GYNECOLOGY

## 2021-01-19 PROCEDURE — 63710000001 ONDANSETRON ODT 4 MG TABLET DISPERSIBLE: Performed by: OBSTETRICS & GYNECOLOGY

## 2021-01-19 PROCEDURE — 87086 URINE CULTURE/COLONY COUNT: CPT | Performed by: OBSTETRICS & GYNECOLOGY

## 2021-01-19 PROCEDURE — 81003 URINALYSIS AUTO W/O SCOPE: CPT | Performed by: OBSTETRICS & GYNECOLOGY

## 2021-01-19 PROCEDURE — 59025 FETAL NON-STRESS TEST: CPT

## 2021-01-19 RX ORDER — ACETAMINOPHEN 325 MG/1
650 TABLET ORAL EVERY 6 HOURS PRN
COMMUNITY

## 2021-01-19 RX ORDER — ONDANSETRON 4 MG/1
8 TABLET, ORALLY DISINTEGRATING ORAL EVERY 6 HOURS PRN
Status: DISCONTINUED | OUTPATIENT
Start: 2021-01-19 | End: 2021-01-19 | Stop reason: HOSPADM

## 2021-01-19 RX ORDER — SODIUM CHLORIDE 0.9 % (FLUSH) 0.9 %
10 SYRINGE (ML) INJECTION AS NEEDED
Status: DISCONTINUED | OUTPATIENT
Start: 2021-01-19 | End: 2021-01-19 | Stop reason: HOSPADM

## 2021-01-19 RX ORDER — SODIUM CHLORIDE 0.9 % (FLUSH) 0.9 %
10 SYRINGE (ML) INJECTION EVERY 12 HOURS SCHEDULED
Status: DISCONTINUED | OUTPATIENT
Start: 2021-01-19 | End: 2021-01-19 | Stop reason: HOSPADM

## 2021-01-19 RX ADMIN — SODIUM CHLORIDE, POTASSIUM CHLORIDE, SODIUM LACTATE AND CALCIUM CHLORIDE 1000 ML: 600; 310; 30; 20 INJECTION, SOLUTION INTRAVENOUS at 19:16

## 2021-01-19 RX ADMIN — ONDANSETRON 8 MG: 4 TABLET, ORALLY DISINTEGRATING ORAL at 18:53

## 2021-01-19 NOTE — TELEPHONE ENCOUNTER
(Cody pt) Pt is 30 weeks pregnant.  Pt states two days ago she started experiencing chills, no fever , no cough.  She says her stomach gets really tight to the point she vomits.  Her urine is orange in color.  Pt does have an appointment with Cody Thursday 1/21 @ @ 2 pm.    Please advise.    Pharmacy verified.    [phone]

## 2021-01-19 NOTE — TELEPHONE ENCOUNTER
Could be dehydration. I would recommend increased water intake and pelvic rest. Can offer visit tomorrow/wed to check vitals/urine and r/o other causes. Just confirm no contact with anyone that has been diagnosed with covid

## 2021-01-20 ENCOUNTER — ROUTINE PRENATAL (OUTPATIENT)
Dept: OBSTETRICS AND GYNECOLOGY | Age: 31
End: 2021-01-20

## 2021-01-20 VITALS — SYSTOLIC BLOOD PRESSURE: 122 MMHG | WEIGHT: 164 LBS | BODY MASS INDEX: 24.94 KG/M2 | DIASTOLIC BLOOD PRESSURE: 64 MMHG

## 2021-01-20 DIAGNOSIS — Z13.89 SCREENING FOR BLOOD OR PROTEIN IN URINE: Primary | ICD-10-CM

## 2021-01-20 DIAGNOSIS — E86.0 DEHYDRATION: ICD-10-CM

## 2021-01-20 LAB
BACTERIA SPEC AEROBE CULT: NO GROWTH
BILIRUB BLD-MCNC: NEGATIVE MG/DL
CLARITY, POC: CLEAR
COLOR UR: YELLOW
GLUCOSE UR STRIP-MCNC: NEGATIVE MG/DL
KETONES UR QL: ABNORMAL
LEUKOCYTE EST, POC: NEGATIVE
NITRITE UR-MCNC: NEGATIVE MG/ML
PH UR: 7 [PH] (ref 5–8)
PROT UR STRIP-MCNC: NEGATIVE MG/DL
RBC # UR STRIP: NEGATIVE /UL
SP GR UR: 1.01 (ref 1–1.03)
UROBILINOGEN UR QL: ABNORMAL

## 2021-01-20 PROCEDURE — 99213 OFFICE O/P EST LOW 20 MIN: CPT | Performed by: PHYSICIAN ASSISTANT

## 2021-01-20 NOTE — OBED NOTES
[facility] Note OB    Patient Name: Анна Lopez  YOB: 1990  MRN: [id number]  Admission Date: 2021  6:18 PM  Date of Service: 2021    Chief Complaint: Abdominal Pain (Patient complains of NV since .  Threw up 2 times on , and 3 times yesterday, none today since she hasn't eaten anything.  No diarrhea.  Also having generalized abdominal pain that is there all the time, and would describe as a cramp that won't go away.  Decreased fetal movement last couple of days since not eating normal.  No LOF, no VB.)        Subjective     Анна Lopez is a 30 y.o. female  at 30w3d with Estimated Date of Delivery: 3/27/21 who presents with the chief complaint listed above. Pt has had N/V thru out the pregnancy and had taken Zofran earlier today with out relief though she did not throw up today. Post ODT Zofran in triage she was able to tolerate PO fluids.     She sees Zelda Ross MD for her prenatal care. Her pregnancy has been complicated by: hx of chlam, BV, trich though last vag swab neg, hx of UTI with kidney disease(none this pregnancy) pos THC first trimester, GERD, hx of seizure do    She describes fetal movement as better now.  She denies rupture of membranes.  She denies vaginal bleeding. She is feeling occasional  contractions.        Objective   Patient Active Problem List    Diagnosis   • Normal labor [O80, Z37.9]   • Pregnancy [Z34.90]   • Gastroesophageal reflux disease without esophagitis [K21.9]   • 32 weeks gestation of pregnancy [Z3A.32]   • Headache [R51.9]   • Seizure disorder (CMS/[facility]) [G40.909]        OB History    Para Term  AB Living   3 2 2 0 0 2   SAB TAB Ectopic Molar Multiple Live Births   0 0 0 0 0 2      # Outcome Date GA Lbr Asim/2nd Weight Sex Delivery Anes PTL Lv   3 Current            2 Term 16 40w1d 06:21 / 00:20 3025 g (6 lb 10.7 oz) F Vag-Spont EPI N EDIL      Name: EARLINE LOPEZTHUY      Apgar1: 9  Apgar5: 9   1 Term  "12/22/13 38w0d   F Vag-Spont EPI N EDIL        Past Medical History:   Diagnosis Date   • Chronic kidney disease     pyelonephritis 1st trimester   • Seizures (CMS/HCC)     last seizure 4yrs ago; no meds   • Urinary tract infection     took last dose of antibiotics 12/02/2016; patient stated it was for bladder infection   • Vaginal delivery 12/22/2013       Past Surgical History:   Procedure Laterality Date   • WISDOM TOOTH EXTRACTION  2015       No current facility-administered medications on file prior to encounter.      Current Outpatient Medications on File Prior to Encounter   Medication Sig Dispense Refill   • acetaminophen (TYLENOL) 325 MG tablet Take 650 mg by mouth Every 6 (Six) Hours As Needed for Mild Pain .     • ondansetron (ZOFRAN) 4 MG tablet Take 1 tablet by mouth Daily As Needed for Nausea or Vomiting. 30 tablet 1   • Prenatal Vit-Fe Fumarate-FA (PNV PRENATAL PLUS MULTIVITAMIN) 27-1 MG tablet Take 1 tablet by mouth Daily. 30 tablet 12       Allergies   Allergen Reactions   • Codeine Other (See Comments)     \"blisters\"       Family History   Problem Relation Age of Onset   • Heart disease Other    • Diabetes Other    • Hypertension Other    • Migraines Other    • Breast cancer Other    • Seizures Other    • Prostate cancer Other    • Ovarian cancer Other    • No Known Problems Father    • No Known Problems Mother    • No Known Problems Brother    • No Known Problems Daughter    • No Known Problems Paternal Grandfather    • No Known Problems Paternal Grandmother    • No Known Problems Maternal Grandmother    • Prostate cancer Maternal Grandfather    • Coronary artery disease Maternal Grandfather    • No Known Problems Daughter        Social History     Socioeconomic History   • Marital status: Single     Spouse name: Not on file   • Number of children: 2   • Years of education: Not on file   • Highest education level: Not on file   Tobacco Use   • Smoking status: Former Smoker     Types: Cigarettes     " "Quit date: 2016     Years since quittin.8   • Smokeless tobacco: Never Used   Substance and Sexual Activity   • Alcohol use: No   • Drug use: Not Currently     Types: Marijuana     Comment: used prior to pregnancy   • Sexual activity: Yes     Partners: Male     Birth control/protection: None           Review of Systems   Constitutional: Negative for chills and fever.   HENT: Negative.    Eyes: Negative for photophobia and visual disturbance.   Respiratory: Negative for shortness of breath.    Cardiovascular: Negative for chest pain.   Gastrointestinal: Positive for abdominal pain, nausea and vomiting.   Psychiatric/Behavioral: The patient is not nervous/anxious.           PHYSICAL EXAM:      VITAL SIGNS:  Vitals:    21 1825 21 1833 21 1849 21 1904   BP:  126/72 128/76 120/70   BP Location:  Right arm     Patient Position:  Lying     Pulse:  72 75 70   Resp:  16     Temp:  98.7 °F (37.1 °C)     TempSrc:  Oral     Weight: 73 kg (161 lb)      Height: 172.7 cm (68\")               FHT'S:                       Baseline:         Fetal Heart Baseline Rate: normal range                   Beats/min:       Fetal HR (beats/min): 140        Variability:        Fetal HR Variability: moderate (amplitude range 6 to 25 bpm)  Accelerations:  Fetal HR Accelerations: greater than/equal to 15 bpm, lasting at least 15 seconds  Decelerations:  Fetal HR Decelerations: absent      Interpretation:  reassuring and category 1                                     PHYSICAL EXAM:      General: well developed; well nourished  no acute distress   Heart: Not performed.   Lungs   breathing is unlabored   Abdomen: Gravid and non tender     Extremities: trace edema, DTRs 1 plus, no clonus       Cervix: Per RN  Cervical Dilation (cm): 1  Cervical Effacement: 30%  Fetal Station: -3  Cervical Consistency: medium  Cervical Position: posterior     Contractions:   Occasional contractions                    LABS AND TESTING " ORDERED:  1. Uterine and fetal monitoring  2. Urinalysis  3.     LAB RESULTS:    Recent Results (from the past 24 hour(s))   Urinalysis With Culture If Indicated - Urine, Clean Catch    Collection Time: 01/19/21  6:26 PM    Specimen: Urine, Clean Catch   Result Value Ref Range    Color, UA Dark Yellow (A) Yellow, Straw    Appearance, UA Clear Clear    pH, UA 6.0 5.0 - 8.0    Specific Gravity, UA 1.016 1.005 - 1.030    Glucose, UA Negative Negative    Ketones, UA 40 mg/dL (2+) (A) Negative    Bilirubin, UA Negative Negative    Blood, UA Negative Negative    Protein, UA Negative Negative    Leuk Esterase, UA Negative Negative    Nitrite, UA Negative Negative    Urobilinogen, UA 4.0 E.U./dL (A) 0.2 - 1.0 E.U./dL       Lab Results   Component Value Date    ABO A 08/14/2020    RH Positive 08/14/2020       Lab Results   Component Value Date    STREPGPB Negative 11/17/2016                 External Prenatal Results     Pregnancy Outside Results - Transcribed From Office Records - See Scanned Records For Details     Test Value Date Time    Hgb 12.1 g/dL 12/10/20 0932      13.6 g/dL 08/14/20 1214    Hct 34.2 % 12/10/20 0932      39.0 % 08/14/20 1214    ABO A  08/14/20 1214    Rh Positive  08/14/20 1214    Antibody Screen Negative  08/14/20 1214    Glucose Fasting GTT 82 mg/dL 12/14/20 0833    Glucose Tolerance Test 1 hour 133 mg/dL 12/14/20 0833    Glucose Tolerance Test 3 hour 98 mg/dL 12/14/20 0833    Gonorrhea (discrete) Negative  01/07/21 0940      Negative  11/09/20 1641      Negative  10/27/20 1010    Chlamydia (discrete) Negative  01/07/21 0940      Negative  11/09/20 1641      Positive  10/27/20 1010    RPR Non Reactive  08/14/20 1214    VDRL Non-Reactive  05/12/16     Syphilis Antibody       Rubella 9.71 index 08/14/20 1214    HBsAg Negative  08/14/20 1214    Herpes Simplex Virus PCR       Herpes Simplex VIrus Culture       HIV Non Reactive  08/14/20 1214    Hep C RNA Quant PCR       Hep C Antibody 0.1 s/co ratio  20 1214    AFP 61.2 ng/mL 10/12/20 0926    Group B Strep Negative  16 1119    GBS Susceptibility to Clindamycin       GBS Susceptibility to Erythromycin       Fetal Fibronectin       Genetic Testing, Maternal Blood             Drug Screening     Test Value Date Time    Urine Drug Screen       Amphetamine Screen Negative ng/mL 20 1641    Barbiturate Screen Negative ng/mL 20 1641    Benzodiazepine Screen Negative ng/mL 20 1641    Methadone Screen Negative ng/mL 20 1641    Phencyclidine Screen Negative ng/mL 20 1641    Opiates Screen Negative  17 1143    THC Screen Positive  17 1143    Cocaine Screen       Propoxyphene Screen Negative ng/mL 20 1641    Buprenorphine Screen       Methamphetamine Screen       Oxycodone Screen Negative  17 1143    Tricyclic Antidepressants Screen                     Impression:   @ 30w3d .  Final Diagnosis: N/V with mild dehydration    Plan:  1. Discharge to home.   post IVFs and PO challenge post Zofran ODT 8 mg  2. Plan of care has been reviewed with patient along with risks, benefits of treatment.   All questions have been answered. Call health care provider for any further concerns and keep office appointments.  3.Hydration precautions, take antiemetics as needed, PTL precautions reviewed       I discussed the patients findings and my recommendations with patient, family and nursing staff      Domi Foster MD  2021  19:38 EST

## 2021-01-21 ENCOUNTER — ROUTINE PRENATAL (OUTPATIENT)
Dept: OBSTETRICS AND GYNECOLOGY | Age: 31
End: 2021-01-21

## 2021-01-21 VITALS — WEIGHT: 164 LBS | SYSTOLIC BLOOD PRESSURE: 112 MMHG | BODY MASS INDEX: 24.94 KG/M2 | DIASTOLIC BLOOD PRESSURE: 64 MMHG

## 2021-01-21 DIAGNOSIS — K21.9 GASTROESOPHAGEAL REFLUX DISEASE WITHOUT ESOPHAGITIS: ICD-10-CM

## 2021-01-21 DIAGNOSIS — Z3A.30 30 WEEKS GESTATION OF PREGNANCY: ICD-10-CM

## 2021-01-21 DIAGNOSIS — G40.909 SEIZURE DISORDER (HCC): ICD-10-CM

## 2021-01-21 DIAGNOSIS — Z13.89 SCREENING FOR BLOOD OR PROTEIN IN URINE: Primary | ICD-10-CM

## 2021-01-21 LAB
BILIRUB BLD-MCNC: ABNORMAL MG/DL
CLARITY, POC: CLEAR
COLOR UR: ABNORMAL
GLUCOSE UR STRIP-MCNC: NEGATIVE MG/DL
KETONES UR QL: ABNORMAL
LEUKOCYTE EST, POC: ABNORMAL
NITRITE UR-MCNC: NEGATIVE MG/ML
PH UR: 6.5 [PH] (ref 5–8)
PROT UR STRIP-MCNC: ABNORMAL MG/DL
RBC # UR STRIP: NEGATIVE /UL
SP GR UR: 1.02 (ref 1–1.03)
UROBILINOGEN UR QL: ABNORMAL

## 2021-01-21 PROCEDURE — 99214 OFFICE O/P EST MOD 30 MIN: CPT | Performed by: PHYSICIAN ASSISTANT

## 2021-01-21 RX ORDER — PANTOPRAZOLE SODIUM 20 MG/1
20 TABLET, DELAYED RELEASE ORAL DAILY
Qty: 30 TABLET | Refills: 1 | Status: SHIPPED | OUTPATIENT
Start: 2021-01-21 | End: 2022-01-21

## 2021-01-21 NOTE — PROGRESS NOTES
Chief Complaint   Patient presents with   • Routine Prenatal Visit       HPI: 30 y.o.  at 30w5d gestation  She is here for f/u u/s for growth  Is doing ok, stable  Still has stomach pain but just recently started taking the pepcid routinely  No vomiting  Is drinking water, gatorade and pedialyte  Growth scan done today, EFW 4 lbs 7 ozs c/w 75.4%  BG 13.07 cm  Urine still looks concentrated but VSS  Will change to protonix and enc BRAT diet until sx's improve  C/w good hydration  Monitor FM and call for any problems  Plan f/u in 1 wk to re assess    Vitals:    21 1338   BP: 112/64   Weight: 74.4 kg (164 lb)       ROS:  GI:  Negative  : na  Pulmonary: Negative     A/P  1. Intrauterine pregnancy at 30w5d   2. Pregnancy Risk:  COMPLICATED    Diagnoses and all orders for this visit:    1. Screening for blood or protein in urine (Primary)  -     POC Urinalysis Dipstick, Multipro    2. Gastroesophageal reflux disease without esophagitis    3. Seizure disorder (CMS/HCC)    4. 30 weeks gestation of pregnancy    Other orders  -     pantoprazole (Protonix) 20 MG EC tablet; Take 1 tablet by mouth Daily.  Dispense: 30 tablet; Refill: 1        -----------------------  PLAN:   Return in about 1 week (around 2021).      LARRY Sierra  2021 14:43 EST

## 2021-01-28 ENCOUNTER — ROUTINE PRENATAL (OUTPATIENT)
Dept: OBSTETRICS AND GYNECOLOGY | Age: 31
End: 2021-01-28

## 2021-01-28 VITALS — DIASTOLIC BLOOD PRESSURE: 68 MMHG | BODY MASS INDEX: 24.63 KG/M2 | WEIGHT: 162 LBS | SYSTOLIC BLOOD PRESSURE: 128 MMHG

## 2021-01-28 DIAGNOSIS — Z13.89 SCREENING FOR BLOOD OR PROTEIN IN URINE: Primary | ICD-10-CM

## 2021-01-28 DIAGNOSIS — Z3A.31 31 WEEKS GESTATION OF PREGNANCY: ICD-10-CM

## 2021-01-28 LAB
BILIRUB BLD-MCNC: NEGATIVE MG/DL
CLARITY, POC: CLEAR
COLOR UR: YELLOW
GLUCOSE UR STRIP-MCNC: NEGATIVE MG/DL
KETONES UR QL: NEGATIVE
LEUKOCYTE EST, POC: NEGATIVE
NITRITE UR-MCNC: NEGATIVE MG/ML
PH UR: 7 [PH] (ref 5–8)
PROT UR STRIP-MCNC: NEGATIVE MG/DL
RBC # UR STRIP: NEGATIVE /UL
SP GR UR: 1.02 (ref 1–1.03)
UROBILINOGEN UR QL: ABNORMAL

## 2021-01-28 PROCEDURE — 99213 OFFICE O/P EST LOW 20 MIN: CPT | Performed by: PHYSICIAN ASSISTANT

## 2021-01-28 NOTE — PROGRESS NOTES
Chief Complaint   Patient presents with   • Routine Prenatal Visit     [id number], OB check today , pt has no complaints        HPI: 30 y.o.  at 31w5d gestation  She is doing ok  Still has some HB sx's, had it last night after eating, eventually vomited  Is not taking tums when sx's occur  Has been taking protonix 20 mg  Will increase to 40 mg and treat break through sx's with tums/rolaids  UA much improved today, is hydrating well  Has good FM as well  Will keep f/u appt in 1 wk with Dr Ross to re assess sx's  Call for any problems in meantime     Vitals:    21 1138   BP: 128/68   Weight: 73.5 kg (162 lb)       ROS:  GI:  Negative  : na  Pulmonary: Negative     A/P  1. Intrauterine pregnancy at 31w5d   2. Pregnancy Risk:  COMPLICATED    Diagnoses and all orders for this visit:    1. Screening for blood or protein in urine (Primary)  -     POC Urinalysis Dipstick    2. 31 weeks gestation of pregnancy        -----------------------  PLAN:   Return in about 1 week (around 2021) for belly check and f/u on GERD with Dr Ross.      LARRY Sierra  2021 12:05 EST

## 2021-02-04 ENCOUNTER — ROUTINE PRENATAL (OUTPATIENT)
Dept: OBSTETRICS AND GYNECOLOGY | Age: 31
End: 2021-02-04

## 2021-02-04 VITALS — WEIGHT: 163 LBS | DIASTOLIC BLOOD PRESSURE: 70 MMHG | BODY MASS INDEX: 24.78 KG/M2 | SYSTOLIC BLOOD PRESSURE: 122 MMHG

## 2021-02-04 DIAGNOSIS — Z3A.32 32 WEEKS GESTATION OF PREGNANCY: ICD-10-CM

## 2021-02-04 DIAGNOSIS — Z13.89 SCREENING FOR HEMATURIA OR PROTEINURIA: ICD-10-CM

## 2021-02-04 DIAGNOSIS — Z34.83 PRENATAL CARE, SUBSEQUENT PREGNANCY, THIRD TRIMESTER: Primary | ICD-10-CM

## 2021-02-04 DIAGNOSIS — Z23 NEED FOR TDAP VACCINATION: ICD-10-CM

## 2021-02-04 DIAGNOSIS — K21.9 GASTROESOPHAGEAL REFLUX DISEASE WITHOUT ESOPHAGITIS: ICD-10-CM

## 2021-02-04 LAB
BILIRUB BLD-MCNC: NEGATIVE MG/DL
GLUCOSE UR STRIP-MCNC: NEGATIVE MG/DL
KETONES UR QL: NEGATIVE
LEUKOCYTE EST, POC: NEGATIVE
NITRITE UR-MCNC: NEGATIVE MG/ML
PH UR: 7 [PH] (ref 5–8)
PROT UR STRIP-MCNC: NEGATIVE MG/DL
RBC # UR STRIP: NEGATIVE /UL
SP GR UR: 1.03 (ref 1–1.03)
UROBILINOGEN UR QL: NORMAL

## 2021-02-04 PROCEDURE — 99213 OFFICE O/P EST LOW 20 MIN: CPT | Performed by: OBSTETRICS & GYNECOLOGY

## 2021-02-04 PROCEDURE — 90471 IMMUNIZATION ADMIN: CPT | Performed by: OBSTETRICS & GYNECOLOGY

## 2021-02-04 PROCEDURE — 90715 TDAP VACCINE 7 YRS/> IM: CPT | Performed by: OBSTETRICS & GYNECOLOGY

## 2021-02-04 NOTE — PROGRESS NOTES
Still with GERD- taking protonix twice daily and improved a bit, still wakes in middle of night with it. Baby moving well, occ [facility]  dtap today  Fu 2 weeks    Chief Complaint   Patient presents with   • Pregnancy Problem     f/u from appt with MP on 01/28/21 for GERD.  TDAP is due. Reports GFM.         HPI:  Pt presents for routine prenatal visit    ROS:  No fever or chills, no nausea or vomiting, no contractions, no leg pain, no LE edema, no leaking fluid, no bleeding, no headache, no dysuria  All other systems reviewed and negative    Exam:  See flow sheet  General:  Alert and oriented and no distress  Neck: no lymphadenopathy or thyromegaly  Lungs: non - labored breathing  Abd:  See flow sheet, fundus nontender  Ext: see flow sheet, non-tender bilateral , no lesions  Neuro: grossly normal    Assessment:/ PLAN:    Diagnoses and all orders for this visit:    1. Prenatal care, subsequent pregnancy, third trimester (Primary)  -     POC Urinalysis Dipstick    2. 32 weeks gestation of pregnancy  -     POC Urinalysis Dipstick    3. Screening for hematuria or proteinuria  -     POC Urinalysis Dipstick    4. Need for Tdap vaccination    5. Gastroesophageal reflux disease without esophagitis

## 2021-02-18 ENCOUNTER — ROUTINE PRENATAL (OUTPATIENT)
Dept: OBSTETRICS AND GYNECOLOGY | Age: 31
End: 2021-02-18

## 2021-02-18 VITALS — DIASTOLIC BLOOD PRESSURE: 80 MMHG | SYSTOLIC BLOOD PRESSURE: 130 MMHG | WEIGHT: 164 LBS | BODY MASS INDEX: 24.94 KG/M2

## 2021-02-18 DIAGNOSIS — Z13.89 SCREENING FOR HEMATURIA OR PROTEINURIA: ICD-10-CM

## 2021-02-18 DIAGNOSIS — Z3A.34 34 WEEKS GESTATION OF PREGNANCY: ICD-10-CM

## 2021-02-18 DIAGNOSIS — Z34.83 PRENATAL CARE, SUBSEQUENT PREGNANCY, THIRD TRIMESTER: Primary | ICD-10-CM

## 2021-02-18 DIAGNOSIS — Z36.85 SCREENING, ANTENATAL, FOR STREPTOCOCCUS B: ICD-10-CM

## 2021-02-18 LAB
GLUCOSE UR STRIP-MCNC: NEGATIVE MG/DL
PROT UR STRIP-MCNC: NEGATIVE MG/DL

## 2021-02-18 PROCEDURE — 99213 OFFICE O/P EST LOW 20 MIN: CPT | Performed by: OBSTETRICS & GYNECOLOGY

## 2021-02-18 NOTE — PROGRESS NOTES
Pt feeling well, no issues, some [facility]  GBS done  Discussed placenta, pt desires to take home to ingest as tablets, discouraged this  Labor warnings and [facility]  Weekly follow up  Chief Complaint   Patient presents with   • Routine Prenatal Visit     Reports GFM and some contractions.        HPI:  Pt presents for routine prenatal visit    ROS:  No fever or chills, no nausea or vomiting, no contractions, no leg pain, no LE edema, no leaking fluid, no bleeding, no headache, no dysuria  All other systems reviewed and negative    Exam:  See flow sheet  General:  Alert and oriented and no distress  Neck: no lymphadenopathy or thyromegaly  Lungs: non - labored breathing  Abd:  See flow sheet, fundus nontender  Ext: see flow sheet, non-tender bilateral , no lesions  Neuro: grossly normal    Assessment:/ PLAN:    Diagnoses and all orders for this visit:    1. Prenatal care, subsequent pregnancy, third trimester (Primary)  -     POC Urinalysis Dipstick    2. 34 weeks gestation of pregnancy  -     POC Urinalysis Dipstick    3. Screening for hematuria or proteinuria  -     POC Urinalysis Dipstick

## 2021-02-20 LAB — GP B STREP DNA SPEC QL NAA+PROBE: NEGATIVE

## 2021-02-22 ENCOUNTER — TELEPHONE (OUTPATIENT)
Dept: OBSTETRICS AND GYNECOLOGY | Age: 31
End: 2021-02-22

## 2021-02-26 ENCOUNTER — ROUTINE PRENATAL (OUTPATIENT)
Dept: OBSTETRICS AND GYNECOLOGY | Age: 31
End: 2021-02-26

## 2021-02-26 VITALS — SYSTOLIC BLOOD PRESSURE: 120 MMHG | BODY MASS INDEX: 25.85 KG/M2 | WEIGHT: 170 LBS | DIASTOLIC BLOOD PRESSURE: 78 MMHG

## 2021-02-26 DIAGNOSIS — Z13.89 SCREENING FOR HEMATURIA OR PROTEINURIA: ICD-10-CM

## 2021-02-26 DIAGNOSIS — Z3A.35 35 WEEKS GESTATION OF PREGNANCY: ICD-10-CM

## 2021-02-26 DIAGNOSIS — Z34.83 PRENATAL CARE, SUBSEQUENT PREGNANCY, THIRD TRIMESTER: Primary | ICD-10-CM

## 2021-02-26 LAB
BILIRUB BLD-MCNC: NEGATIVE MG/DL
CLARITY, POC: CLEAR
COLOR UR: YELLOW
GLUCOSE UR STRIP-MCNC: NEGATIVE MG/DL
KETONES UR QL: NEGATIVE
LEUKOCYTE EST, POC: NEGATIVE
NITRITE UR-MCNC: NEGATIVE MG/ML
PH UR: 6.5 [PH] (ref 5–8)
PROT UR STRIP-MCNC: NEGATIVE MG/DL
RBC # UR STRIP: NEGATIVE /UL
SP GR UR: 1.02 (ref 1–1.03)
UROBILINOGEN UR QL: NORMAL

## 2021-02-26 PROCEDURE — 99213 OFFICE O/P EST LOW 20 MIN: CPT | Performed by: OBSTETRICS & GYNECOLOGY

## 2021-02-26 NOTE — PROGRESS NOTES
GBS negative, some contractions  Labor warnings and C  Doing well,     Chief Complaint   Patient presents with   • Routine Prenatal Visit     Reports GFM. Contractions but nothing consistent.  GBS neg on 2/18/21       HPI:  Pt presents for routine prenatal visit    ROS:  No fever or chills, no nausea or vomiting, no contractions, no leg pain, no LE edema, no leaking fluid, no bleeding, no headache, no dysuria  All other systems reviewed and negative    Exam:  See flow sheet  General:  Alert and oriented and no distress  Neck: no lymphadenopathy or thyromegaly  Lungs: non - labored breathing  Abd:  See flow sheet, fundus nontender  Ext: see flow sheet, non-tender bilateral , no lesions  Neuro: grossly normal    Assessment:/ PLAN:    Diagnoses and all orders for this visit:    1. Prenatal care, subsequent pregnancy, third trimester (Primary)  -     POC Urinalysis Dipstick    2. 35 weeks gestation of pregnancy  -     POC Urinalysis Dipstick    3. Screening for hematuria or proteinuria  -     POC Urinalysis Dipstick

## 2021-03-02 ENCOUNTER — HOSPITAL ENCOUNTER (EMERGENCY)
Facility: HOSPITAL | Age: 31
Discharge: HOME | End: 2021-03-02
Attending: OBSTETRICS & GYNECOLOGY | Admitting: OBSTETRICS & GYNECOLOGY

## 2021-03-02 VITALS
SYSTOLIC BLOOD PRESSURE: 134 MMHG | HEART RATE: 67 BPM | BODY MASS INDEX: 25.76 KG/M2 | OXYGEN SATURATION: 99 % | RESPIRATION RATE: 18 BRPM | HEIGHT: 68 IN | WEIGHT: 170 LBS | DIASTOLIC BLOOD PRESSURE: 73 MMHG | TEMPERATURE: 98.4 F

## 2021-03-02 PROCEDURE — 59025 FETAL NON-STRESS TEST: CPT

## 2021-03-02 PROCEDURE — 99284 EMERGENCY DEPT VISIT MOD MDM: CPT | Performed by: OBSTETRICS & GYNECOLOGY

## 2021-03-02 NOTE — OBED NOTES
[facility] Note OB    Patient Name: Анна Lopez  YOB: 1990  MRN: [id number]  Admission Date: 3/2/2021  5:10 PM  Date of Service: 3/2/2021    Chief Complaint: Pelvic Pain (pt to [facility] with complaint of pelvic pain since this am, pt denies LOF or VB. pt reports +FM )        Subjective     Анна Lopez is a 30 y.o. female  at 36w3d with Estimated Date of Delivery: 3/27/21 who presents with the chief complaint listed above. Pt points to bilateral groin and states this is the source of her pain. It is described as constant burning sensation that occasionally radiates to back.     She sees Zelda Ross MD for her prenatal care. Her pregnancy has been complicated by: hx of STD(chlam), hx of THC use, hx of UTI    She describes fetal movement as normal.  She denies rupture of membranes.  She denies vaginal bleeding. She is not feeling contractions.        Objective   Patient Active Problem List    Diagnosis   • Normal labor [O80, Z37.9]   • Pregnancy [Z34.90]   • Gastroesophageal reflux disease without esophagitis [K21.9]   • 32 weeks gestation of pregnancy [Z3A.32]   • Headache [R51.9]   • Seizure disorder (CMS/HCC) [G40.909]        OB History    Para Term  AB Living   3 2 2 0 0 2   SAB TAB Ectopic Molar Multiple Live Births   0 0 0 0 0 2      # Outcome Date GA Lbr Asim/2nd Weight Sex Delivery Anes PTL Lv   3 Current            2 Term 16 40w1d 06:21 / 00:20 3025 g (6 lb 10.7 oz) F Vag-Spont EPI N EDIL      Name: OMAR LOPEZ      Apgar1: 9  Apgar5: 9   1 Term 13 38w0d   F Vag-Spont EPI N EDIL        Past Medical History:   Diagnosis Date   • Chronic kidney disease     pyelonephritis 1st trimester   • Seizures (CMS/HCC)     last seizure 4yrs ago; no meds   • Urinary tract infection     took last dose of antibiotics 2016; patient stated it was for bladder infection   • Vaginal delivery 2013       Past Surgical History:   Procedure Laterality Date   •  "WISDOM TOOTH EXTRACTION         No current facility-administered medications on file prior to encounter.      Current Outpatient Medications on File Prior to Encounter   Medication Sig Dispense Refill   • acetaminophen (TYLENOL) 325 MG tablet Take 650 mg by mouth Every 6 (Six) Hours As Needed for Mild Pain .     • Prenatal Vit-Fe Fumarate-FA (PNV PRENATAL PLUS MULTIVITAMIN) 27-1 MG tablet Take 1 tablet by mouth Daily. 30 tablet 12       Allergies   Allergen Reactions   • Codeine Other (See Comments)     \"blisters\"       Family History   Problem Relation Age of Onset   • Heart disease Other    • Diabetes Other    • Hypertension Other    • Migraines Other    • Breast cancer Other    • Seizures Other    • Prostate cancer Other    • Ovarian cancer Other    • No Known Problems Father    • No Known Problems Mother    • No Known Problems Brother    • No Known Problems Daughter    • No Known Problems Paternal Grandfather    • No Known Problems Paternal Grandmother    • No Known Problems Maternal Grandmother    • Prostate cancer Maternal Grandfather    • Coronary artery disease Maternal Grandfather    • No Known Problems Daughter        Social History     Socioeconomic History   • Marital status: Significant Other     Spouse name: Gerard   • Number of children: 2   • Years of education: Not on file   • Highest education level: Not on file   Social Needs   • Financial resource strain: Not hard at all   • Food insecurity     Worry: Patient refused     Inability: Patient refused   • Transportation needs     Medical: Patient refused     Non-medical: Patient refused   Tobacco Use   • Smoking status: Former Smoker     Types: Cigarettes     Quit date: 2016     Years since quittin.9   • Smokeless tobacco: Never Used   Substance and Sexual Activity   • Alcohol use: No   • Drug use: Not Currently     Types: Marijuana     Comment: used prior to pregnancy   • Sexual activity: Yes     Partners: Male     Birth " "control/protection: None     Comment: sig other = BRAIN    Lifestyle   • Physical activity     Days per week: Not on file     Minutes per session: Not on file   • Stress: Not at all           Review of Systems   Constitutional: Negative for chills and fever.   HENT: Negative.    Eyes: Negative for photophobia and visual disturbance.   Respiratory: Negative for shortness of breath.    Cardiovascular: Negative for chest pain.   Gastrointestinal: Positive for abdominal pain. Negative for nausea.   Psychiatric/Behavioral: The patient is not nervous/anxious.           PHYSICAL EXAM:      VITAL SIGNS:  Vitals:    03/02/21 1713 03/02/21 1725   BP:  134/73   BP Location:  Right arm   Patient Position:  Sitting   Pulse:  67   Resp:  18   Temp:  98.4 °F (36.9 °C)   TempSrc:  Oral   SpO2:  99%   Weight: 77.4 kg (170 lb 9.6 oz) 77.1 kg (170 lb)   Height:  172.7 cm (68\")            FHT'S:                       Baseline:         Fetal Heart Baseline Rate: normal range                   Beats/min:       Fetal HR (beats/min): 155        Variability:        Fetal HR Variability: moderate (amplitude range 6 to 25 bpm)  Accelerations:  Fetal HR Accelerations: greater than/equal to 15 bpm, lasting at least 15 seconds  Decelerations:  Fetal HR Decelerations: absent      Interpretation:  reassuring and category 1                                     PHYSICAL EXAM:      General: well developed; well nourished  no acute distress   Heart: Not performed.   Lungs   breathing is unlabored   Abdomen: Gravid and non tender     Extremities: trace edema, DTRs 1 plus, no clonus       Cervix: Per RN, no change post 1.5 hours  Cervical Dilation (cm): 2  Cervical Effacement: 60%  Fetal Station: -2  Cervical Consistency: soft  Cervical Position: posterior     Contractions:   irregular                    LABS AND TESTING ORDERED:  1. Uterine and fetal monitoring  2. Urinalysis  3. Serial cervical exams    LAB RESULTS:    No results found for this or any " previous visit (from the past 24 hour(s)).    Lab Results   Component Value Date    ABO A 08/14/2020    RH Positive 08/14/2020       Lab Results   Component Value Date    STREPGPB Negative 02/18/2021                 External Prenatal Results     Pregnancy Outside Results - Transcribed From Office Records - See Scanned Records For Details     Test Value Date Time    Hgb 12.1 g/dL 12/10/20 0932      13.6 g/dL 08/14/20 1214    Hct 34.2 % 12/10/20 0932      39.0 % 08/14/20 1214    ABO A  08/14/20 1214    Rh Positive  08/14/20 1214    Antibody Screen Negative  08/14/20 1214    Glucose Fasting GTT 82 mg/dL 12/14/20 0833    Glucose Tolerance Test 1 hour 133 mg/dL 12/14/20 0833    Glucose Tolerance Test 3 hour 98 mg/dL 12/14/20 0833    Gonorrhea (discrete) Negative  01/07/21 0940      Negative  11/09/20 1641      Negative  10/27/20 1010    Chlamydia (discrete) Negative  01/07/21 0940      Negative  11/09/20 1641      Positive  10/27/20 1010    RPR Non Reactive  08/14/20 1214    VDRL Non-Reactive  05/12/16     Syphilis Antibody       Rubella 9.71 index 08/14/20 1214    HBsAg Negative  08/14/20 1214    Herpes Simplex Virus PCR       Herpes Simplex VIrus Culture       HIV Non Reactive  08/14/20 1214    Hep C RNA Quant PCR       Hep C Antibody 0.1 s/co ratio 08/14/20 1214    AFP 61.2 ng/mL 10/12/20 0926    Group B Strep Negative  02/18/21 1515    GBS Susceptibility to Clindamycin       GBS Susceptibility to Erythromycin       Fetal Fibronectin       Genetic Testing, Maternal Blood             Drug Screening     Test Value Date Time    Urine Drug Screen       Amphetamine Screen Negative ng/mL 11/09/20 1641    Barbiturate Screen Negative ng/mL 11/09/20 1641    Benzodiazepine Screen Negative ng/mL 11/09/20 1641    Methadone Screen Negative ng/mL 11/09/20 1641    Phencyclidine Screen Negative ng/mL 11/09/20 1641    Opiates Screen Negative  01/13/17 1143    THC Screen Positive  01/13/17 1143    Cocaine Screen       Propoxyphene  Screen Negative ng/mL 20 1641    Buprenorphine Screen       Methamphetamine Screen       Oxycodone Screen Negative  17 1143    Tricyclic Antidepressants Screen                     Impression:   @ 36w3d .  Final Diagnosis: probable round ligament pain, OB exam benign    Plan:  1. Discharge to home.    2. Plan of care has been reviewed with patient along with risks, benefits of treatment.   All questions have been answered. Call health care provider for any further concerns and keep office appointments.  3. Comfort measures and labor precautions reviewed       I discussed the patients findings and my recommendations with patient and nursing staff      Domi Foster MD  3/2/2021  18:55 EST

## 2021-03-05 ENCOUNTER — ROUTINE PRENATAL (OUTPATIENT)
Dept: OBSTETRICS AND GYNECOLOGY | Age: 31
End: 2021-03-05

## 2021-03-05 VITALS — SYSTOLIC BLOOD PRESSURE: 116 MMHG | WEIGHT: 171 LBS | DIASTOLIC BLOOD PRESSURE: 72 MMHG | BODY MASS INDEX: 26 KG/M2

## 2021-03-05 DIAGNOSIS — Z13.89 SCREENING FOR HEMATURIA OR PROTEINURIA: ICD-10-CM

## 2021-03-05 DIAGNOSIS — Z34.83 PRENATAL CARE, SUBSEQUENT PREGNANCY, THIRD TRIMESTER: Primary | ICD-10-CM

## 2021-03-05 DIAGNOSIS — Z3A.36 36 WEEKS GESTATION OF PREGNANCY: ICD-10-CM

## 2021-03-05 PROCEDURE — 99213 OFFICE O/P EST LOW 20 MIN: CPT | Performed by: OBSTETRICS & GYNECOLOGY

## 2021-03-05 NOTE — PROGRESS NOTES
Chief Complaint   Patient presents with   • Routine Prenatal Visit     Reports GFM and occ contractions.        HPI:  Pt presents for routine prenatal visit    ROS:  No fever or chills, no nausea or vomiting, no contractions, no leg pain, no LE edema, no leaking fluid, no bleeding, no headache, no dysuria  All other systems reviewed and negative    Exam:  See flow sheet  General:  Alert and oriented and no distress  Neck: no lymphadenopathy or thyromegaly  Lungs: non - labored breathing  Abd:  See flow sheet, fundus nontender  Ext: see flow sheet, non-tender bilateral , no lesions  Neuro: grossly normal    Assessment:/ PLAN:    Diagnoses and all orders for this visit:    1. Prenatal care, subsequent pregnancy, third trimester (Primary)  -     POC Urinalysis Dipstick    2. 36 weeks gestation of pregnancy  -     POC Urinalysis Dipstick    3. Screening for hematuria or proteinuria  -     POC Urinalysis Dipstick

## 2021-03-05 NOTE — PROGRESS NOTES
Feels well, occ contractions  GBS negative  Labor warnings and [facility]  MIL 3/23/21 if still not delivered  Labor warnings and [facility]

## 2021-03-15 ENCOUNTER — ROUTINE PRENATAL (OUTPATIENT)
Dept: OBSTETRICS AND GYNECOLOGY | Age: 31
End: 2021-03-15

## 2021-03-15 VITALS — WEIGHT: 174 LBS | DIASTOLIC BLOOD PRESSURE: 82 MMHG | SYSTOLIC BLOOD PRESSURE: 124 MMHG | BODY MASS INDEX: 26.46 KG/M2

## 2021-03-15 DIAGNOSIS — Z3A.38 38 WEEKS GESTATION OF PREGNANCY: ICD-10-CM

## 2021-03-15 DIAGNOSIS — Z13.89 SCREENING FOR HEMATURIA OR PROTEINURIA: ICD-10-CM

## 2021-03-15 DIAGNOSIS — Z34.83 PRENATAL CARE, SUBSEQUENT PREGNANCY, THIRD TRIMESTER: Primary | ICD-10-CM

## 2021-03-15 DIAGNOSIS — Z34.90 TERM PREGNANCY: ICD-10-CM

## 2021-03-15 LAB
BILIRUB BLD-MCNC: NEGATIVE MG/DL
GLUCOSE UR STRIP-MCNC: NEGATIVE MG/DL
KETONES UR QL: NEGATIVE
LEUKOCYTE EST, POC: NEGATIVE
NITRITE UR-MCNC: NEGATIVE MG/ML
PH UR: 6 [PH] (ref 5–8)
PROT UR STRIP-MCNC: NEGATIVE MG/DL
RBC # UR STRIP: NEGATIVE /UL
SP GR UR: 1.03 (ref 1–1.03)
UROBILINOGEN UR QL: NORMAL

## 2021-03-15 PROCEDURE — 99212 OFFICE O/P EST SF 10 MIN: CPT | Performed by: OBSTETRICS & GYNECOLOGY

## 2021-03-15 NOTE — PROGRESS NOTES
Pt feeling well, some contractions and pressure  Active movement  Discussed [facility] 3/23  Labor warnings and [facility]  Chief Complaint   Patient presents with   • Routine Prenatal Visit     Reports GFM. Reports pelvic pain.  GBS  = neg.        HPI:  Pt presents for routine prenatal visit    ROS:  No fever or chills, no nausea or vomiting, no contractions, no leg pain, no LE edema, no leaking fluid, no bleeding, no headache, no dysuria  All other systems reviewed and negative    Exam:  See flow sheet  General:  Alert and oriented and no distress  Neck: no lymphadenopathy or thyromegaly  Lungs: non - labored breathing  Abd:  See flow sheet, fundus nontender  Ext: see flow sheet, non-tender bilateral , no lesions  Neuro: grossly normal    Assessment:/ PLAN:    Diagnoses and all orders for this visit:    1. Prenatal care, subsequent pregnancy, third trimester (Primary)  -     POC Urinalysis Dipstick    2. 38 weeks gestation of pregnancy  -     POC Urinalysis Dipstick    3. Screening for hematuria or proteinuria  -     POC Urinalysis Dipstick    4. Term pregnancy  -     Labor Induction; Future

## 2021-03-23 ENCOUNTER — ANESTHESIA (OUTPATIENT)
Dept: LABOR AND DELIVERY | Facility: HOSPITAL | Age: 31
End: 2021-03-23

## 2021-03-23 ENCOUNTER — HOSPITAL ENCOUNTER (INPATIENT)
Dept: LABOR AND DELIVERY | Facility: HOSPITAL | Age: 31
Discharge: HOME | End: 2021-03-23

## 2021-03-23 ENCOUNTER — HOSPITAL ENCOUNTER (INPATIENT)
Facility: HOSPITAL | Age: 31
LOS: 2 days | Discharge: HOME | End: 2021-03-25
Attending: OBSTETRICS & GYNECOLOGY | Admitting: OBSTETRICS & GYNECOLOGY

## 2021-03-23 ENCOUNTER — ANESTHESIA EVENT (OUTPATIENT)
Dept: LABOR AND DELIVERY | Facility: HOSPITAL | Age: 31
End: 2021-03-23

## 2021-03-23 PROBLEM — Z3A.39 39 WEEKS GESTATION OF PREGNANCY: Status: ACTIVE | Noted: 2021-03-23

## 2021-03-23 LAB
ABO GROUP BLD: NORMAL
BLD GP AB SCN SERPL QL: NEGATIVE
RH BLD: POSITIVE
SARS-COV-2 RNA RESP QL NAA+PROBE: NOT DETECTED
T&S EXPIRATION DATE: NORMAL

## 2021-03-23 PROCEDURE — 86850 RBC ANTIBODY SCREEN: CPT | Performed by: OBSTETRICS & GYNECOLOGY

## 2021-03-23 PROCEDURE — U0003 INFECTIOUS AGENT DETECTION BY NUCLEIC ACID (DNA OR RNA); SEVERE ACUTE RESPIRATORY SYNDROME CORONAVIRUS 2 (SARS-COV-2) (CORONAVIRUS DISEASE [COVID-19]), AMPLIFIED PROBE TECHNIQUE, MAKING USE OF HIGH THROUGHPUT TECHNOLOGIES AS DESCRIBED BY CMS-2020-01-R: HCPCS | Performed by: OBSTETRICS & GYNECOLOGY

## 2021-03-23 PROCEDURE — 86900 BLOOD TYPING SEROLOGIC ABO: CPT | Performed by: OBSTETRICS & GYNECOLOGY

## 2021-03-23 PROCEDURE — 86901 BLOOD TYPING SEROLOGIC RH(D): CPT | Performed by: OBSTETRICS & GYNECOLOGY

## 2021-03-23 PROCEDURE — 85025 COMPLETE CBC W/AUTO DIFF WBC: CPT | Performed by: OBSTETRICS & GYNECOLOGY

## 2021-03-23 RX ORDER — LIDOCAINE HYDROCHLORIDE 10 MG/ML
5 INJECTION, SOLUTION EPIDURAL; INFILTRATION; INTRACAUDAL; PERINEURAL AS NEEDED
Status: DISCONTINUED | OUTPATIENT
Start: 2021-03-23 | End: 2021-03-23 | Stop reason: HOSPADM

## 2021-03-23 RX ORDER — EPHEDRINE SULFATE 50 MG/ML
5 INJECTION, SOLUTION INTRAVENOUS AS NEEDED
Status: DISCONTINUED | OUTPATIENT
Start: 2021-03-23 | End: 2021-03-23 | Stop reason: HOSPADM

## 2021-03-23 RX ORDER — MAGNESIUM CARB/ALUMINUM HYDROX 105-160MG
30 TABLET,CHEWABLE ORAL ONCE
Status: DISCONTINUED | OUTPATIENT
Start: 2021-03-23 | End: 2021-03-23 | Stop reason: HOSPADM

## 2021-03-23 RX ORDER — SODIUM CHLORIDE 0.9 % (FLUSH) 0.9 %
10 SYRINGE (ML) INJECTION AS NEEDED
Status: DISCONTINUED | OUTPATIENT
Start: 2021-03-23 | End: 2021-03-23 | Stop reason: HOSPADM

## 2021-03-23 RX ORDER — ACETAMINOPHEN 325 MG/1
650 TABLET ORAL EVERY 4 HOURS PRN
Status: DISCONTINUED | OUTPATIENT
Start: 2021-03-23 | End: 2021-03-23 | Stop reason: HOSPADM

## 2021-03-23 RX ORDER — FAMOTIDINE 10 MG/ML
20 INJECTION, SOLUTION INTRAVENOUS ONCE AS NEEDED
Status: DISCONTINUED | OUTPATIENT
Start: 2021-03-23 | End: 2021-03-23 | Stop reason: HOSPADM

## 2021-03-23 RX ORDER — ONDANSETRON 2 MG/ML
4 INJECTION INTRAMUSCULAR; INTRAVENOUS ONCE AS NEEDED
Status: DISCONTINUED | OUTPATIENT
Start: 2021-03-23 | End: 2021-03-23 | Stop reason: HOSPADM

## 2021-03-23 RX ORDER — ONDANSETRON 4 MG/1
4 TABLET, FILM COATED ORAL EVERY 6 HOURS PRN
Status: DISCONTINUED | OUTPATIENT
Start: 2021-03-23 | End: 2021-03-23 | Stop reason: HOSPADM

## 2021-03-23 RX ORDER — OXYTOCIN-SODIUM CHLORIDE 0.9% IV SOLN 30 UNIT/500ML 30-0.9/5 UT/ML-%
2-20 SOLUTION INTRAVENOUS
Status: DISCONTINUED | OUTPATIENT
Start: 2021-03-23 | End: 2021-03-23 | Stop reason: HOSPADM

## 2021-03-23 RX ORDER — DIPHENHYDRAMINE HYDROCHLORIDE 50 MG/ML
12.5 INJECTION INTRAMUSCULAR; INTRAVENOUS EVERY 8 HOURS PRN
Status: DISCONTINUED | OUTPATIENT
Start: 2021-03-23 | End: 2021-03-23 | Stop reason: HOSPADM

## 2021-03-23 RX ORDER — TERBUTALINE SULFATE 1 MG/ML
0.25 INJECTION, SOLUTION SUBCUTANEOUS AS NEEDED
Status: DISCONTINUED | OUTPATIENT
Start: 2021-03-23 | End: 2021-03-23 | Stop reason: HOSPADM

## 2021-03-23 RX ORDER — ONDANSETRON 2 MG/ML
4 INJECTION INTRAMUSCULAR; INTRAVENOUS EVERY 6 HOURS PRN
Status: DISCONTINUED | OUTPATIENT
Start: 2021-03-23 | End: 2021-03-23 | Stop reason: HOSPADM

## 2021-03-23 RX ORDER — SODIUM CHLORIDE, SODIUM LACTATE, POTASSIUM CHLORIDE, CALCIUM CHLORIDE 600; 310; 30; 20 MG/100ML; MG/100ML; MG/100ML; MG/100ML
125 INJECTION, SOLUTION INTRAVENOUS CONTINUOUS
Status: DISCONTINUED | OUTPATIENT
Start: 2021-03-23 | End: 2021-03-25 | Stop reason: HOSPADM

## 2021-03-23 RX ADMIN — OXYTOCIN 2 MILLI-UNITS/MIN: 10 INJECTION INTRAVENOUS at 21:20

## 2021-03-23 RX ADMIN — SODIUM CHLORIDE, POTASSIUM CHLORIDE, SODIUM LACTATE AND CALCIUM CHLORIDE 125 ML/HR: 600; 310; 30; 20 INJECTION, SOLUTION INTRAVENOUS at 20:42

## 2021-03-23 NOTE — H&P
[facility]  Obstetric History and Physical    Chief Complaint   Patient presents with   • Scheduled Induction     Term IOL, +FM, -LOF, - bleeding       Subjective     Patient is a 30 y.o. female  currently at 39w3d, who presents with term pregnancy for induction of labor.    Her prenatal care is benign.  Her previous obstetric/gynecological history is noted for is non-contributory.    The following portions of the patients history were reviewed and updated as appropriate: current medications, allergies, past medical history, past surgical history, past family history, past social history and problem list .       Prenatal Information:  Prenatal Results     POC Urine Glucose/Protein     Test Value Reference Range Date Time    Urine Glucose  Negative mg/dL Negative, 1000 mg/dL (3+) 03/15/21 1117    Urine Protein  Negative mg/dL Negative 03/15/21 1117          Initial Prenatal Labs     Test Value Reference Range Date Time    Hemoglobin  13.6 g/dL 11.1 - 15.9 20 1214    Hematocrit  39.0 % 34.0 - 46.6 20 1214    Platelets  195 10*3/mm3 140 - 450 12/10/20 0932       256 x10E3/uL 150 - 450 20 1214    Rubella IgG  9.71 index Immune >0.99 20 1214    Hepatitis B SAg  Negative  Negative 20 1214    Hepatitis C Ab  0.1 s/co ratio 0.0 - 0.9 20 1214    RPR  Non Reactive  Non Reactive 20 1214    ABO  A   20 1214    Rh  Positive   20 1214    Antibody Screen  Negative  Negative 20 1214    HIV  Non Reactive  Non Reactive 20 1214    Urine Culture  No growth   21 1826       Final report   20 1641       Final report   10/27/20 1020    Gonorrhea  Negative  Negative 21 0940       Negative  Negative 20 1641       Negative  Negative 10/27/20 1010    Chlamydia  Negative  Negative 21 0940       Negative  Negative 20 1641       Positive  Negative 10/27/20 1010    TSH              2nd and 3rd Trimester     Test Value Reference Range  Date Time    Hemoglobin (repeated)  12.1 g/dL 12.0 - 15.9 12/10/20 0932    Hematocrit (repeated)  34.2 % 34.0 - 46.6 12/10/20 0932    GCT  187 mg/dL 65 - 139 12/10/20 0932    Antibody Screen (repeated)        GTT Fasting  82 mg/dL 65 - 94 12/14/20 0833    GTT 1 Hr  133 mg/dL 65 - 179 12/14/20 0833    GTT 2 Hr  73 mg/dL 65 - 154 12/14/20 0833    GTT 3 Hr  98 mg/dL 65 - 139 12/14/20 0833    Group B Strep  Negative  Negative 02/18/21 1515          Drug Screening     Test Value Reference Range Date Time    Amphetamine Screen  Negative ng/mL Qiqsvg=4053 11/09/20 1641    Barbiturate Screen  Negative ng/mL Ovjyam=400 11/09/20 1641    Benzodiazepine Screen  Negative ng/mL Ijdmby=431 11/09/20 1641    Methadone Screen  Negative ng/mL Okmphf=436 11/09/20 1641    Phencyclidine Screen  Negative ng/mL Cutoff=25 11/09/20 1641    Opiates Screen  Negative ng/mL Mmfzdj=536 11/09/20 1641    THC Screen  Negative ng/mL Cutoff=50 11/09/20 1641    Cocaine Screen  Negative ng/mL Xikpsv=039 11/09/20 1641    Propoxyphene Screen  Negative ng/mL Mipqtf=247 11/09/20 1641    Buprenorphine Screen        Methamphetamine Screen        Oxycodone Screen        Tricyclic Antidepressants Screen              Other (Risk screening)     Test Value Reference Range Date Time    Varicella IgG  >4000 index Immune >165 08/14/20 1214    Parvovirus IgG        CMV IgG        Cystic Fibrosis        Hemoglobin electrophoresis        NIPT        MSAFP-4        AFP (for NTD only)  *Screen Negative*   10/12/20 0926          Legend    ^: Historical                      External Prenatal Results     Pregnancy Outside Results - Transcribed From Office Records - See Scanned Records For Details     Test Value Date Time    Hgb  12.1 g/dL 12/10/20 0932       13.6 g/dL 08/14/20 1214    Hct  34.2 % 12/10/20 0932       39.0 % 08/14/20 1214    ABO  A  08/14/20 1214    Rh  Positive  08/14/20 1214    Antibody Screen  Negative  08/14/20 1214    Glucose Fasting GTT  82 mg/dL  20 0833    Glucose Tolerance Test 1 hour  133 mg/dL 20 0833    Glucose Tolerance Test 3 hour  98 mg/dL 20 0833    Gonorrhea (discrete)  Negative  21 0940       Negative  20 1641       Negative  10/27/20 1010    Chlamydia (discrete)  Negative  21 0940       Negative  20 1641       Positive  10/27/20 1010    RPR  Non Reactive  20 1214    VDRL ^ Non-Reactive  16     Syphilis Antibody       Rubella  9.71 index 20 1214    HBsAg  Negative  20 1214    Herpes Simplex Virus PCR       Herpes Simplex VIrus Culture       HIV  Non Reactive  20 1214    Hep C RNA Quant PCR       Hep C Antibody  0.1 s/co ratio 20 1214    AFP  61.2 ng/mL 10/12/20 0926    Group B Strep  Negative  21 1515    GBS Susceptibility to Clindamycin       GBS Susceptibility to Erythromycin       Fetal Fibronectin       Genetic Testing, Maternal Blood             Drug Screening     Test Value Date Time    Urine Drug Screen       Amphetamine Screen  Negative ng/mL 20 1641    Barbiturate Screen  Negative ng/mL 20 1641    Benzodiazepine Screen  Negative ng/mL 20 1641    Methadone Screen  Negative ng/mL 20 1641    Phencyclidine Screen  Negative ng/mL 20 1641    Opiates Screen  Negative  17 1143    THC Screen  Positive  17 1143    Cocaine Screen       Propoxyphene Screen  Negative ng/mL 20 1641    Buprenorphine Screen       Methamphetamine Screen       Oxycodone Screen  Negative  17 1143    Tricyclic Antidepressants Screen             Legend    ^: Historical                         Past OB History:     OB History    Para Term  AB Living   3 2 2 0 0 2   SAB TAB Ectopic Molar Multiple Live Births   0 0 0 0 0 2      # Outcome Date GA Lbr Asim/2nd Weight Sex Delivery Anes PTL Lv   3 Current            2 Term 16 40w1d 06:21 / 00:20 3025 g (6 lb 10.7 oz) F Vag-Spont EPI N EDIL      Name: OMAR DICK       Apgar1: 9  Apgar5: 9   1 Term 12/22/13 38w0d   F Vag-Spont EPI N EDIL       Past Medical History: Past Medical History:   Diagnosis Date   • Chronic kidney disease     pyelonephritis 1st trimester   • Seizures (CMS/HCC)     last seizure 4yrs ago; no meds   • Urinary tract infection     took last dose of antibiotics 12/02/2016; patient stated it was for bladder infection   • Vaginal delivery 12/22/2013      Past Surgical History Past Surgical History:   Procedure Laterality Date   • WISDOM TOOTH EXTRACTION  2015      Family History: Family History   Problem Relation Age of Onset   • Heart disease Other    • Diabetes Other    • Hypertension Other    • Migraines Other    • Breast cancer Other    • Seizures Other    • Prostate cancer Other    • Ovarian cancer Other    • No Known Problems Father    • No Known Problems Mother    • No Known Problems Brother    • No Known Problems Daughter    • No Known Problems Paternal Grandfather    • No Known Problems Paternal Grandmother    • No Known Problems Maternal Grandmother    • Prostate cancer Maternal Grandfather    • Coronary artery disease Maternal Grandfather    • No Known Problems Daughter       Social History:  reports that she quit smoking about 4 years ago. Her smoking use included cigarettes. She has never used smokeless tobacco.   reports no history of alcohol use.   reports previous drug use. Drug: Marijuana.        General ROS: Pertinent items are noted in HPI, all other systems reviewed and negative    Objective       Vital Signs Range for the last 24 hours  Temperature:     Temp Source:     BP: BP: (137)/(87-93) 137/93   Pulse: Heart Rate:  [73-80] 73   Respirations:     SPO2:     O2 Amount (l/min):     O2 Devices     Weight: Weight:  [83 kg (183 lb)] 83 kg (183 lb)     Physical Examination: General appearance - alert, well appearing, and in no distress  Mental status - alert, oriented to person, place, and time  Neck - supple, no significant adenopathy  Chest -  clear to auscultation, no wheezes, rales or rhonchi, symmetric air entry  Heart - normal rate and regular rhythm  Abdomen - gravid, EFW 7 1/2 lbs  Neurological - alert, oriented, normal speech, no focal findings or movement disorder noted  Musculoskeletal - no joint tenderness, deformity or swelling  Extremities - peripheral pulses normal, no pedal edema, no clubbing or cyanosis  Skin - normal coloration and turgor, no rashes, no suspicious skin lesions noted    Presentation: vtx   Cervix: Exam by: Method: sterile exam per RN (CAMI Chau RN)   Dilation: Cervical Dilation (cm): 2   Effacement: Cervical Effacement: 60%   Station: Fetal Station: 1-->-2       Fetal Heart Rate Assessment   Method: Fetal HR Assessment Method: external   Beats/min: Fetal HR (beats/min): 135   Baseline: Fetal Heart Baseline Rate: normal range   Variability: Fetal HR Variability: moderate (amplitude range 6 to 25 bpm)   Accels: Fetal HR Accelerations: greater than/equal to 15 bpm, lasting at least 15 seconds   Decels: Fetal HR Decelerations: absent   Tracing Category:       Uterine Assessment   Method: Method: external tocotransducer, per patient report, palpation   Frequency (min): Contraction Frequency (Minutes): 1-10   Ctx Count in 10 min:     Duration:     Intensity: Contraction Intensity: mild by palpation   Intensity by IUPC:     Resting Tone: Uterine Resting Tone: soft by palpation   Resting Tone by IUPC:     [place] Units:       Laboratory Results:   Lab Results (last 24 hours)     Procedure Component Value Units Date/Time    COVID PRE-OP / PRE-PROCEDURE SCREENING ORDER (NO ISOLATION) - Swab, Nasopharynx [[id number]]  (Normal) Collected: 03/23/21 2012    Specimen: Swab from Nasopharynx Updated: 03/23/21 2122    Narrative:      The following orders were created for panel order COVID PRE-OP / PRE-PROCEDURE SCREENING ORDER (NO ISOLATION) - Swab, Nasopharynx.  Procedure                               Abnormality         Status                      ---------                               -----------         ------                     COVID-19,[facility]HOU[facility]...[[id number]]  Normal              Final result                 Please view results for these tests on the individual orders.    COVID-19,[facility] CEPHEID, NP SWAB IN TRANSPORT MEDIA 8-12 HR TAT - Swab, Nasopharynx [[id number]]  (Normal) Collected: 21    Specimen: Swab from Nasopharynx Updated: 21     COVID19 Not Detected    Narrative:      Fact sheet for providers: https://www.fda.gov/media/295854/download     Fact sheet for patients: https://www.fda.gov/media/767318/download        Radiology Review: none  Other Studies: none    Assessment/Plan       Pregnancy    39 weeks gestation of pregnancy        Assessment:  1.  Intrauterine pregnancy at 39w3d gestation with reactive fetal status.    2.  induction of labor  for term  with favorable cervix  3.  Obstetrical history significant for is non-contributory.  4.  GBS status:   Strep Gp B JUNIOR   Date Value Ref Range Status   2021 Negative Negative Final     Comment:     Centers for Disease Control and Prevention (CDC) and American Congress  of Obstetricians and Gynecologists (ACOG) guidelines for prevention of   group B streptococcal (GBS) disease specify co-collection of  a vaginal and rectal swab specimen to maximize sensitivity of GBS  detection. Per the CDC and ACOG, swabbing both the lower vagina and  rectum substantially increases the yield of detection compared with  sampling the vagina alone.  Penicillin G, ampicillin, or cefazolin are indicated for intrapartum  prophylaxis of  GBS colonization. Reflex susceptibility  testing should be performed prior to use of clindamycin only on GBS  isolates from penicillin-allergic women who are considered a high risk  for anaphylaxis. Treatment with vancomycin without additional testing  is warranted if resistance to clindamycin is noted.          Plan:  1. fetal and uterine monitoring  continuously, labor augmentation  Pitocin and analgesia with  epidural  2. Plan of care has been reviewed with patient and family  3.  Risks, benefits of treatment plan have been discussed.  4.  All questions have been answered.  5.  Anticipate       Zelda Ross MD  3/23/2021  22:07 EDT

## 2021-03-24 LAB
BASOPHILS # BLD AUTO: 0.01 10*3/MM3 (ref 0–0.2)
BASOPHILS NFR BLD AUTO: 0.2 % (ref 0–1.5)
DEPRECATED RDW RBC AUTO: 38.5 FL (ref 37–54)
EOSINOPHIL # BLD AUTO: 0.02 10*3/MM3 (ref 0–0.4)
EOSINOPHIL NFR BLD AUTO: 0.3 % (ref 0.3–6.2)
ERYTHROCYTE [DISTWIDTH] IN BLOOD BY AUTOMATED COUNT: 12.6 % (ref 12.3–15.4)
HCT VFR BLD AUTO: 33.8 % (ref 34–46.6)
HGB BLD-MCNC: 11.4 G/DL (ref 12–15.9)
IMM GRANULOCYTES # BLD AUTO: 0.02 10*3/MM3 (ref 0–0.05)
IMM GRANULOCYTES NFR BLD AUTO: 0.3 % (ref 0–0.5)
LYMPHOCYTES # BLD AUTO: 1.8 10*3/MM3 (ref 0.7–3.1)
LYMPHOCYTES NFR BLD AUTO: 29 % (ref 19.6–45.3)
MCH RBC QN AUTO: 28.4 PG (ref 26.6–33)
MCHC RBC AUTO-ENTMCNC: 33.7 G/DL (ref 31.5–35.7)
MCV RBC AUTO: 84.3 FL (ref 79–97)
MONOCYTES # BLD AUTO: 0.69 10*3/MM3 (ref 0.1–0.9)
MONOCYTES NFR BLD AUTO: 11.1 % (ref 5–12)
NEUTROPHILS NFR BLD AUTO: 3.67 10*3/MM3 (ref 1.7–7)
NEUTROPHILS NFR BLD AUTO: 59.1 % (ref 42.7–76)
NRBC BLD AUTO-RTO: 0 /100 WBC (ref 0–0.2)
PLATELET # BLD AUTO: 198 10*3/MM3 (ref 140–450)
PMV BLD AUTO: 12.1 FL (ref 6–12)
RBC # BLD AUTO: 4.01 10*6/MM3 (ref 3.77–5.28)
WBC # BLD AUTO: 6.21 10*3/MM3 (ref 3.4–10.8)

## 2021-03-24 PROCEDURE — C1755 CATHETER, INTRASPINAL: HCPCS

## 2021-03-24 PROCEDURE — 3E033VJ INTRODUCTION OF OTHER HORMONE INTO PERIPHERAL VEIN, PERCUTANEOUS APPROACH: ICD-10-PCS | Performed by: OBSTETRICS & GYNECOLOGY

## 2021-03-24 PROCEDURE — 10907ZC DRAINAGE OF AMNIOTIC FLUID, THERAPEUTIC FROM PRODUCTS OF CONCEPTION, VIA NATURAL OR ARTIFICIAL OPENING: ICD-10-PCS | Performed by: OBSTETRICS & GYNECOLOGY

## 2021-03-24 PROCEDURE — 25010000002 ROPIVACAINE PER 1 MG: Performed by: ANESTHESIOLOGY

## 2021-03-24 PROCEDURE — C1755 CATHETER, INTRASPINAL: HCPCS | Performed by: ANESTHESIOLOGY

## 2021-03-24 PROCEDURE — 59410 OBSTETRICAL CARE: CPT | Performed by: OBSTETRICS & GYNECOLOGY

## 2021-03-24 PROCEDURE — 0KQM0ZZ REPAIR PERINEUM MUSCLE, OPEN APPROACH: ICD-10-PCS | Performed by: OBSTETRICS & GYNECOLOGY

## 2021-03-24 RX ORDER — ROPIVACAINE HYDROCHLORIDE 2 MG/ML
INJECTION, SOLUTION EPIDURAL; INFILTRATION; PERINEURAL AS NEEDED
Status: DISCONTINUED | OUTPATIENT
Start: 2021-03-24 | End: 2021-03-24 | Stop reason: SURG

## 2021-03-24 RX ORDER — FAMOTIDINE 10 MG/ML
20 INJECTION, SOLUTION INTRAVENOUS ONCE AS NEEDED
Status: DISCONTINUED | OUTPATIENT
Start: 2021-03-24 | End: 2021-03-25 | Stop reason: HOSPADM

## 2021-03-24 RX ORDER — PROMETHAZINE HYDROCHLORIDE 12.5 MG/1
12.5 TABLET ORAL EVERY 4 HOURS PRN
Status: DISCONTINUED | OUTPATIENT
Start: 2021-03-24 | End: 2021-03-25 | Stop reason: HOSPADM

## 2021-03-24 RX ORDER — OXYTOCIN-SODIUM CHLORIDE 0.9% IV SOLN 30 UNIT/500ML 30-0.9/5 UT/ML-%
125 SOLUTION INTRAVENOUS CONTINUOUS PRN
Status: COMPLETED | OUTPATIENT
Start: 2021-03-24 | End: 2021-03-24

## 2021-03-24 RX ORDER — OXYCODONE HYDROCHLORIDE AND ACETAMINOPHEN 5; 325 MG/1; MG/1
2 TABLET ORAL EVERY 4 HOURS PRN
Status: DISCONTINUED | OUTPATIENT
Start: 2021-03-24 | End: 2021-03-25 | Stop reason: HOSPADM

## 2021-03-24 RX ORDER — HYDROCORTISONE 25 MG/G
1 CREAM TOPICAL AS NEEDED
Status: DISCONTINUED | OUTPATIENT
Start: 2021-03-24 | End: 2021-03-25 | Stop reason: HOSPADM

## 2021-03-24 RX ORDER — BISACODYL 10 MG
10 SUPPOSITORY, RECTAL RECTAL DAILY PRN
Status: DISCONTINUED | OUTPATIENT
Start: 2021-03-25 | End: 2021-03-25 | Stop reason: HOSPADM

## 2021-03-24 RX ORDER — ACETAMINOPHEN 325 MG/1
650 TABLET ORAL EVERY 4 HOURS PRN
Status: DISCONTINUED | OUTPATIENT
Start: 2021-03-24 | End: 2021-03-24 | Stop reason: HOSPADM

## 2021-03-24 RX ORDER — ONDANSETRON 2 MG/ML
4 INJECTION INTRAMUSCULAR; INTRAVENOUS EVERY 6 HOURS PRN
Status: DISCONTINUED | OUTPATIENT
Start: 2021-03-24 | End: 2021-03-24 | Stop reason: HOSPADM

## 2021-03-24 RX ORDER — IBUPROFEN 600 MG/1
600 TABLET ORAL EVERY 6 HOURS PRN
Status: DISCONTINUED | OUTPATIENT
Start: 2021-03-24 | End: 2021-03-25 | Stop reason: HOSPADM

## 2021-03-24 RX ORDER — OXYCODONE HYDROCHLORIDE AND ACETAMINOPHEN 5; 325 MG/1; MG/1
1 TABLET ORAL EVERY 4 HOURS PRN
Status: DISCONTINUED | OUTPATIENT
Start: 2021-03-24 | End: 2021-03-25 | Stop reason: HOSPADM

## 2021-03-24 RX ORDER — ONDANSETRON 2 MG/ML
4 INJECTION INTRAMUSCULAR; INTRAVENOUS ONCE
Status: DISCONTINUED | OUTPATIENT
Start: 2021-03-24 | End: 2021-03-25 | Stop reason: HOSPADM

## 2021-03-24 RX ORDER — CARBOPROST TROMETHAMINE 250 UG/ML
250 INJECTION, SOLUTION INTRAMUSCULAR AS NEEDED
Status: DISCONTINUED | OUTPATIENT
Start: 2021-03-24 | End: 2021-03-24 | Stop reason: HOSPADM

## 2021-03-24 RX ORDER — METHYLERGONOVINE MALEATE 0.2 MG/ML
200 INJECTION INTRAVENOUS ONCE AS NEEDED
Status: DISCONTINUED | OUTPATIENT
Start: 2021-03-24 | End: 2021-03-24 | Stop reason: HOSPADM

## 2021-03-24 RX ORDER — PHYTONADIONE 1 MG/.5ML
INJECTION, EMULSION INTRAMUSCULAR; INTRAVENOUS; SUBCUTANEOUS
Status: DISPENSED
Start: 2021-03-24 | End: 2021-03-24

## 2021-03-24 RX ORDER — OXYCODONE HYDROCHLORIDE AND ACETAMINOPHEN 5; 325 MG/1; MG/1
2 TABLET ORAL EVERY 4 HOURS PRN
Status: DISCONTINUED | OUTPATIENT
Start: 2021-03-24 | End: 2021-03-24 | Stop reason: HOSPADM

## 2021-03-24 RX ORDER — OXYTOCIN-SODIUM CHLORIDE 0.9% IV SOLN 30 UNIT/500ML 30-0.9/5 UT/ML-%
250 SOLUTION INTRAVENOUS CONTINUOUS PRN
Status: DISPENSED | OUTPATIENT
Start: 2021-03-24 | End: 2021-03-24

## 2021-03-24 RX ORDER — ONDANSETRON 4 MG/1
4 TABLET, FILM COATED ORAL EVERY 8 HOURS PRN
Status: DISCONTINUED | OUTPATIENT
Start: 2021-03-24 | End: 2021-03-25 | Stop reason: HOSPADM

## 2021-03-24 RX ORDER — ERYTHROMYCIN 5 MG/G
OINTMENT OPHTHALMIC
Status: DISPENSED
Start: 2021-03-24 | End: 2021-03-24

## 2021-03-24 RX ORDER — OXYTOCIN-SODIUM CHLORIDE 0.9% IV SOLN 30 UNIT/500ML 30-0.9/5 UT/ML-%
999 SOLUTION INTRAVENOUS ONCE
Status: COMPLETED | OUTPATIENT
Start: 2021-03-24 | End: 2021-03-24

## 2021-03-24 RX ORDER — ONDANSETRON 4 MG/1
4 TABLET, FILM COATED ORAL EVERY 6 HOURS PRN
Status: DISCONTINUED | OUTPATIENT
Start: 2021-03-24 | End: 2021-03-24 | Stop reason: HOSPADM

## 2021-03-24 RX ORDER — LIDOCAINE HYDROCHLORIDE AND EPINEPHRINE 15; 5 MG/ML; UG/ML
INJECTION, SOLUTION EPIDURAL AS NEEDED
Status: DISCONTINUED | OUTPATIENT
Start: 2021-03-24 | End: 2021-03-24 | Stop reason: SURG

## 2021-03-24 RX ORDER — DOCUSATE SODIUM 100 MG/1
100 CAPSULE, LIQUID FILLED ORAL 2 TIMES DAILY
Status: DISCONTINUED | OUTPATIENT
Start: 2021-03-24 | End: 2021-03-25 | Stop reason: HOSPADM

## 2021-03-24 RX ORDER — DIPHENHYDRAMINE HYDROCHLORIDE 50 MG/ML
12.5 INJECTION INTRAMUSCULAR; INTRAVENOUS EVERY 8 HOURS PRN
Status: DISCONTINUED | OUTPATIENT
Start: 2021-03-24 | End: 2021-03-25 | Stop reason: HOSPADM

## 2021-03-24 RX ORDER — MISOPROSTOL 200 UG/1
800 TABLET ORAL AS NEEDED
Status: DISCONTINUED | OUTPATIENT
Start: 2021-03-24 | End: 2021-03-24 | Stop reason: HOSPADM

## 2021-03-24 RX ADMIN — DOCUSATE SODIUM 100 MG: 100 CAPSULE, LIQUID FILLED ORAL at 20:23

## 2021-03-24 RX ADMIN — Medication 10 ML/HR: at 00:21

## 2021-03-24 RX ADMIN — OXYTOCIN 125 ML/HR: 10 INJECTION INTRAVENOUS at 03:46

## 2021-03-24 RX ADMIN — ROPIVACAINE HYDROCHLORIDE 10 ML: 2 INJECTION, SOLUTION EPIDURAL; INFILTRATION at 00:18

## 2021-03-24 RX ADMIN — SODIUM CHLORIDE, POTASSIUM CHLORIDE, SODIUM LACTATE AND CALCIUM CHLORIDE 999 ML/HR: 600; 310; 30; 20 INJECTION, SOLUTION INTRAVENOUS at 00:14

## 2021-03-24 RX ADMIN — IBUPROFEN 600 MG: 600 TABLET, FILM COATED ORAL at 13:40

## 2021-03-24 RX ADMIN — LIDOCAINE HYDROCHLORIDE AND EPINEPHRINE 3 ML: 15; 5 INJECTION, SOLUTION EPIDURAL at 00:14

## 2021-03-24 RX ADMIN — Medication: at 06:35

## 2021-03-24 RX ADMIN — IBUPROFEN 600 MG: 600 TABLET, FILM COATED ORAL at 05:04

## 2021-03-24 RX ADMIN — OXYCODONE HYDROCHLORIDE AND ACETAMINOPHEN 1 TABLET: 5; 325 TABLET ORAL at 08:41

## 2021-03-24 RX ADMIN — IBUPROFEN 600 MG: 600 TABLET, FILM COATED ORAL at 20:23

## 2021-03-24 RX ADMIN — OXYCODONE HYDROCHLORIDE AND ACETAMINOPHEN 1 TABLET: 5; 325 TABLET ORAL at 20:23

## 2021-03-24 RX ADMIN — OXYTOCIN 999 ML/HR: 10 INJECTION INTRAVENOUS at 02:31

## 2021-03-24 RX ADMIN — DOCUSATE SODIUM 100 MG: 100 CAPSULE, LIQUID FILLED ORAL at 08:41

## 2021-03-24 NOTE — L&D DELIVERY NOTE
[facility]  Vaginal Delivery Note    Delivery     Delivery: Vaginal, Spontaneous     YOB: 2019    Time of Birth:  Gestational Age 2:28 AM   39w4d     Anesthesia: Epidural     Delivering clinician: Zelda Ross    Forceps?   No   Vacuum? No    Shoulder dystocia present: No        Delivery narrative:    The patient was admitted for induction at term with favorable cervix. Her GBS status was negative. The FHTs were reassuring throughout the labor and delivery course. She received pitocin induction and amniotomy performed at 3-4 cm with clear fluid.  She received epidural for anesthesia.  She progressed along a normal labor curve to completely dilated.  The patient pushed for 3 contractions for  a spontaneous vaginal delivery. The infant was bulb suctioned on the perineum and again after delivery.  The infant had good tone and cry.  The infant was placed on mothers abdomen. Gases were not sent. Cord blood was obtained. The placenta did follow spontaneously. The uterus was explored. A second degree laceration was repaired in the usual fashion.  The patient tolerated the procedure well.  Mother and infant are recovering well at this time      Infant    Findings: male  infant     Infant observations: Weight: No birth weight on file.   Length:   in  Observations/Comments:  scale 4      Apgars: 8  @ 1 minute /    9  @ 5 minutes   Infant Name: undecided     Placenta, Cord, and Fluid    Placenta delivered  Spontaneous  at   3/24/2021  2:31 AM     Cord: 3 vessels  present.   Nuchal Cord?  no   Cord blood obtained: Yes    Cord gases obtained:  No    Cord gas results: Venous:  No results found for: PHCVEN    Arterial:  No results found for: PHCART     Repair    Episiotomy: Not recorded     No    Lacerations: Yes  Laceration Information  Laceration Repaired?   Perineal:  2  yes   Periurethral:       Labial:       Sulcus:       Vaginal:       Cervical:         Suture used for repair: 3-0 Vicryl   Estimated  Blood Loss:  100           Complications  none    Disposition  Mother to Mother Baby/Postpartum  in stable condition currently.  Baby to NBN  in stable condition currently.      Zelda Ross MD  03/24/21  02:41 EDT

## 2021-03-24 NOTE — ANESTHESIA PREPROCEDURE EVALUATION
Anesthesia Evaluation                  Airway   Mallampati: II  TM distance: >3 FB  Neck ROM: full  Dental - normal exam     Pulmonary - normal exam   Cardiovascular - normal exam        Neuro/Psych  (+) seizures, headaches,     GI/Hepatic/Renal/Endo      Musculoskeletal     Abdominal    Substance History      OB/GYN    (+) Pregnant,         Other                        Anesthesia Plan    ASA 3     epidural       Anesthetic plan, all risks, benefits, and alternatives have been provided, discussed and informed consent has been obtained with: patient.

## 2021-03-24 NOTE — ANESTHESIA PROCEDURE NOTES
Labor Epidural      Patient reassessed immediately prior to procedure    Patient location during procedure: OB  Performed By  Anesthesiologist: Jamie Hinton MD  Preanesthetic Checklist  Completed: patient identified, risks and benefits discussed, pre-op evaluation and timeout performed  Additional Notes  39 week 4 day gestation  Prep:  Pt Position:sitting  Sterile Tech:cap, gloves, mask and sterile barrier  Prep:chlorhexidine gluconate and isopropyl alcohol  Monitoring:blood pressure monitoring, continuous pulse oximetry and EKG  Epidural Block Procedure:  Approach:midline  Guidance:landmark technique  Location:L3-L4  Needle Type:Tuohy  Needle Gauge:17  Loss of Resistance Medium: air  Loss of Resistance: 7cm  Cath Depth at skin:15 cm  Paresthesia: none  Aspiration:negative  Test Dose:negative  Number of Attempts: 1  Post Assessment:  Dressing:occlusive dressing applied and secured with tape  Pt Tolerance:patient tolerated the procedure well with no apparent complications  Complications:no

## 2021-03-24 NOTE — LACTATION NOTE
Mom resting. Encouraged to call with a feeding today to check baby's latch. She reports she had some soreness before with BF her other children. Educated on maternal diet. Mom had personal pump    Lactation Consult Note    Evaluation Completed: 3/24/2021 08:05 EDT  Patient Name: Анна Lopez  :  1990  MRN:  [id number]     REFERRAL  INFORMATION:                                         DELIVERY HISTORY:        Skin to skin initiation date/time: 3/24/2021  2:29 AM   Skin to skin end date/time: 3/24/2021  4:07 AM        MATERNAL ASSESSMENT:                               INFANT ASSESSMENT:  Information for the patient's :  Jose LopezGalilea [[id number]]   No past medical history on file.                                                                                                     MATERNAL INFANT FEEDING:                                                                       EQUIPMENT TYPE:                                 BREAST PUMPING:          LACTATION REFERRALS:

## 2021-03-24 NOTE — PLAN OF CARE
Problem: Adult Inpatient Plan of Care  Goal: Plan of Care Review  Outcome: Ongoing, Progressing  Flowsheets (Taken 3/24/2021 0310)  Progress: improving  Plan of Care Reviewed With: patient  Outcome Summary:  this AM to baby boy. Pitocin IOL, epidural for pain management. Bleeding scant to light, 2 below U at this time, fundus firm without massage. Pain under control at this time. Anticipate KAILEY and BF in recovery and anticipate transfer to MB when recovery is over.   Goal Outcome Evaluation:  Plan of Care Reviewed With: patient  Progress: improving  Outcome Summary:  this AM to baby boy. Pitocin IOL, epidural for pain management. Bleeding scant to light, 2 below U at this time, fundus firm without massage. Pain under control at this time. Anticipate KAILEY and BF in recovery and anticipate transfer to MB when recovery is over.  Domi Badillo RN

## 2021-03-25 VITALS
SYSTOLIC BLOOD PRESSURE: 120 MMHG | TEMPERATURE: 97.3 F | OXYGEN SATURATION: 99 % | RESPIRATION RATE: 18 BRPM | DIASTOLIC BLOOD PRESSURE: 74 MMHG | WEIGHT: 183 LBS | BODY MASS INDEX: 27.83 KG/M2 | HEART RATE: 62 BPM

## 2021-03-25 LAB
BASOPHILS # BLD AUTO: 0.02 10*3/MM3 (ref 0–0.2)
BASOPHILS NFR BLD AUTO: 0.3 % (ref 0–1.5)
DEPRECATED RDW RBC AUTO: 37.9 FL (ref 37–54)
EOSINOPHIL # BLD AUTO: 0.09 10*3/MM3 (ref 0–0.4)
EOSINOPHIL NFR BLD AUTO: 1.2 % (ref 0.3–6.2)
ERYTHROCYTE [DISTWIDTH] IN BLOOD BY AUTOMATED COUNT: 12.4 % (ref 12.3–15.4)
HCT VFR BLD AUTO: 31.7 % (ref 34–46.6)
HGB BLD-MCNC: 10.6 G/DL (ref 12–15.9)
IMM GRANULOCYTES # BLD AUTO: 0.03 10*3/MM3 (ref 0–0.05)
IMM GRANULOCYTES NFR BLD AUTO: 0.4 % (ref 0–0.5)
LYMPHOCYTES # BLD AUTO: 2.42 10*3/MM3 (ref 0.7–3.1)
LYMPHOCYTES NFR BLD AUTO: 32.7 % (ref 19.6–45.3)
MCH RBC QN AUTO: 28 PG (ref 26.6–33)
MCHC RBC AUTO-ENTMCNC: 33.4 G/DL (ref 31.5–35.7)
MCV RBC AUTO: 83.9 FL (ref 79–97)
MONOCYTES # BLD AUTO: 0.54 10*3/MM3 (ref 0.1–0.9)
MONOCYTES NFR BLD AUTO: 7.3 % (ref 5–12)
NEUTROPHILS NFR BLD AUTO: 4.29 10*3/MM3 (ref 1.7–7)
NEUTROPHILS NFR BLD AUTO: 58.1 % (ref 42.7–76)
NRBC BLD AUTO-RTO: 0 /100 WBC (ref 0–0.2)
PLATELET # BLD AUTO: 173 10*3/MM3 (ref 140–450)
PMV BLD AUTO: 11.3 FL (ref 6–12)
RBC # BLD AUTO: 3.78 10*6/MM3 (ref 3.77–5.28)
WBC # BLD AUTO: 7.39 10*3/MM3 (ref 3.4–10.8)

## 2021-03-25 PROCEDURE — 85025 COMPLETE CBC W/AUTO DIFF WBC: CPT | Performed by: OBSTETRICS & GYNECOLOGY

## 2021-03-25 RX ORDER — IBUPROFEN 600 MG/1
600 TABLET ORAL EVERY 6 HOURS PRN
Qty: 60 TABLET | Refills: 1 | Status: SHIPPED | OUTPATIENT
Start: 2021-03-25

## 2021-03-25 RX ORDER — OXYCODONE HYDROCHLORIDE AND ACETAMINOPHEN 5; 325 MG/1; MG/1
1 TABLET ORAL EVERY 6 HOURS PRN
Qty: 12 TABLET | Refills: 0 | Status: SHIPPED | OUTPATIENT
Start: 2021-03-25 | End: 2021-04-03

## 2021-03-25 RX ADMIN — Medication: at 02:00

## 2021-03-25 RX ADMIN — IBUPROFEN 600 MG: 600 TABLET, FILM COATED ORAL at 02:32

## 2021-03-25 RX ADMIN — OXYCODONE HYDROCHLORIDE AND ACETAMINOPHEN 1 TABLET: 5; 325 TABLET ORAL at 09:52

## 2021-03-25 RX ADMIN — OXYCODONE HYDROCHLORIDE AND ACETAMINOPHEN 1 TABLET: 5; 325 TABLET ORAL at 01:58

## 2021-03-25 RX ADMIN — DOCUSATE SODIUM 100 MG: 100 CAPSULE, LIQUID FILLED ORAL at 09:52

## 2021-03-25 RX ADMIN — IBUPROFEN 600 MG: 600 TABLET, FILM COATED ORAL at 09:52

## 2021-03-25 NOTE — LACTATION NOTE
This note was copied from a baby's chart.  Informed PT that LC is here to help with BF tonight. Offered assistance but mother declined, said she will call later, when baby is due to BF if she needs help. Reports infant is latching well but has been very sleepy. PT mentioned she BF her other children for about 6 months each.  Mother declines any questions and concerns at this time. Encouraged to call LC if needing further assistance.

## 2021-03-25 NOTE — PROGRESS NOTES
"Discharge Planning Assessment  [facility]     Patient Name: Анна Lopez  MRN: [id number]  Today's Date: 3/25/2021    Admit Date: 3/23/2021    Discharge Needs Assessment    No documentation.       Discharge Plan     Row Name 03/25/21 1255       Plan    Plan  Infant to discharge home with mother when medically ready. LCSW will follow for cord toxicology results. Ray Baptiste, CHICOW    Plan Comments  Mother: Анна Lopez,  [id number]; Infant:Veronica Lopez, [id number]. LCSW was consulted for \" .THC use in first trimester\" LCSW spoke with DIAZ Malcolm, who had no additional concerns. Of note, infant had a negative urine toxicology screen on admission and there was no urine toxicology on mother. No prenatal urine toxicology screens available in EPIC. Cord toxicology was sent, LCSW will follow for cord toxicology results and will report to CPS if warranted. LCSW met with mother at bedside. Father was present and sleeping on couch. Mother gave permission for father to be present during discussion with LCSW. Mother holding and interacting appropriately with infant during discussion. Mother verified address, phone and insurance. Mother reports she has spoken with [email or web address] about adding infant to coverage. Mother reports she has car seat, crib, bassinette, clothes, diapers and other supplies for infant. Mother plans to breastfeed and shared it is going well. Mother reports a good support system in her mother. Mother plans on infant follow up with [facility] and feels comfortable scheduling appointments and will have transportation to appointments. Mother denies any violence, threats, or feeling unsafe. Mother denies any needs or concerns. LCSW educated mother about cord toxicology being sent and if it is positive a CPS report would be made at that time. Mother denies any substance use. Mother polite and cooperative with LCSW during discussion. Mother denied any other needs or concerns. LCSW provided " mother with resources packet and briefly discussed resources in packet. Packet includes info on WIC, HANDS, infant supplies, domestic violence, transportation, counseling, and general community resources. LCSW will follow for cord toxicology results. Ray Baptiste LCSW    Final Discharge Disposition Code  01 - home or self-care        Continued Care and Services - Admitted Since 3/23/2021    Coordination has not been started for this encounter.       Expected Discharge Date and Time     Expected Discharge Date Expected Discharge Time    Mar 25, 2021         Demographic Summary     Row Name 03/25/21 1254       General Information    Admission Type  inpatient    Arrived From  home    Referral Source  nursing    Reason for Consult  substance use concerns        Functional Status    No documentation.       Psychosocial     Row Name 03/25/21 1255       Behavior WDL    Behavior WDL  WDL       Emotion Mood WDL    Emotion/Mood/Affect WDL  WDL       Speech WDL    Speech WDL  WDL       Perceptual State WDL    Perceptual State WDL  WDL       Thought Process WDL    Thought Process WDL  WDL       Intellectual Performance WDL    Intellectual Performance WDL  WDL        Abuse/Neglect     Row Name 03/25/21 1255       Personal Safety    Feels Unsafe at Home or Work/School  no    Feels Threatened by Someone  no    Does Anyone Try to Keep You From Having Contact with Others or Doing Things Outside Your Home?  no    Physical Signs of Abuse Present  no        Legal    No documentation.       Substance Abuse    No documentation.       Patient Forms    No documentation.           Ray Baptiste

## 2021-03-25 NOTE — PROGRESS NOTES
3/25/2021    Name:Анна Lopez   MR#:[id number]    Vaginal Delivery Progress Note    HD#2    Subjective   Postpartum Day 1: 30 y.o. yo Female  delivered at 39w4d  delivered a male  infant.     The patient feels well.  Her pain is controlled.    She is ambulating well.  Patient describes her bleeding as thin lochia.    Breastfeeding: infant latching without difficulty.     Patient Active Problem List   Diagnosis   • Headache   • Seizure disorder (CMS/HCC)   • Gastroesophageal reflux disease without esophagitis   • 32 weeks gestation of pregnancy   • Pregnancy   • Normal labor   • 39 weeks gestation of pregnancy       Objective   Vital Signs Range for the last 24 hours  Temp: Temp:  [97.3 °F (36.3 °C)-99.6 °F (37.6 °C)] 97.3 °F (36.3 °C) Temp src: Oral   BP: BP: (115-122)/(73-80) 120/74        Pulse: Heart Rate:  [62-69] 62  RR: Resp:  [16-18] 18  Weight: 83 kg (183 lb)  BMI:  Body mass index is 27.83 kg/m².      Lab Results   Component Value Date    WBC 7.39 2021    HGB 10.6 (L) 2021    HCT 31.7 (L) 2021    MCV 83.9 2021     2021       Physical Exam  General:  no acute distresss.  Abdomen: abdomen is soft without significant tenderness, masses, organomegaly or guarding. Fundus: Firm with scant lochia  Extremities: no cyanosis, and trace edema, no CT    Perineum:  Intact    Assessment/Plan   1.  PPD# 1  Desires to go home today, delivered early on day 1. Appropriate and plan for DC. Desires limited Rx narcotic.      Plan:  Routine Postpartum care      Maggi Delacruz MD  3/25/2021 10:25 EDT

## 2021-03-25 NOTE — DISCHARGE SUMMARY
Vaginal delivery Discharge Summary      Date of Admission: 3/23/2021    Date of Discharge:  3/25/2021    Patient: Анна Lopez      MR#:[id number]    Surgeon/OB: Zelda Ross     Discharge Diagnosis: Vaginal Delivery at 39w4d, uncomplicated recovery    Procedures:  Vaginal, Spontaneous     3/24/2021    2:28 AM      Anesthesia:  Epidural     Presenting Problem/History of Present Illness  Pregnancy [Z34.90]     Patient Active Problem List   Diagnosis   • Headache   • Seizure disorder (CMS/HCC)   • Gastroesophageal reflux disease without esophagitis   • 32 weeks gestation of pregnancy   • Pregnancy   • Normal labor   • 39 weeks gestation of pregnancy   •  (normal spontaneous vaginal delivery)       Hospital Course  Patient is a 30 y.o. female  at 39w4d status post vaginal delivery.    Uneventful recovery.  Patient is ambulating, tolerating a regular diet.  Perineum is intact.    Infant:   male  fetus 3828 g (8 lb 7 oz)  with Apgar scores of 8 , 9  at five minutes.    Condition on Discharge:  Stable    Vital Signs  Temp:  [97.3 °F (36.3 °C)-99.6 °F (37.6 °C)] 97.3 °F (36.3 °C)  Heart Rate:  [62-69] 62  Resp:  [16-18] 18  BP: (115-122)/(73-80) 120/74    Lab Results   Component Value Date    WBC 7.39 2021    HGB 10.6 (L) 2021    HCT 31.7 (L) 2021    MCV 83.9 2021     2021       Discharge Disposition  Home or Self Care    Discharge Medications     Discharge Medications      New Medications      Instructions Start Date   ibuprofen 600 MG tablet  Commonly known as: ADVIL,MOTRIN   600 mg, Oral, Every 6 Hours PRN      oxyCODONE-acetaminophen 5-325 MG per tablet  Commonly known as: PERCOCET   1 tablet, Oral, Every 6 Hours PRN         Changes to Medications      Instructions Start Date   pantoprazole 20 MG EC tablet  Commonly known as: Protonix  What changed: when to take this   20 mg, Oral, Daily         Continue These Medications      Instructions Start Date    acetaminophen 325 MG tablet  Commonly known as: TYLENOL   650 mg, Oral, Every 6 Hours PRN      PNV Prenatal Plus Multivitamin 27-1 MG tablet   1 tablet, Oral, Daily             Discharge Diet: Regular    Activity at Discharge:   Activity Instructions     Pelvic Rest      Nothing in the vagina for six weeks          Follow-up Appointments  Future Appointments   Date Time Provider Department Center   5/6/2021  9:15 AM Zelda Ross MD [facility]     Additional Instructions for the Follow-ups that You Need to Schedule     Call MD With Problems / Concerns   As directed      Monitor your bleeding and if is too heavy then please call the office or come to the hospital.   If you have fever, abdominal tenderness, or otherwise feel unwell please call or come to the hospital.  Watch for signs and symptoms of postpartum preeclamspia which include: high blood pressure (systolic 140 or greater, diastolic 90 or greater), terrible headache not relieved with tylenol, upper abdominal pain, persistently seeing spots or flashing lights, persistent nausea and vomiting. If you have any of these please call the office or come to the hospital.    Order Comments: Monitor your bleeding and if is too heavy then please call the office or come to the hospital. If you have fever, abdominal tenderness, or otherwise feel unwell please call or come to the hospital. Watch for signs and symptoms of postpartum preeclamspia which include: high blood pressure (systolic 140 or greater, diastolic 90 or greater), terrible headache not relieved with tylenol, upper abdominal pain, persistently seeing spots or flashing lights, persistent nausea and vomiting. If you have any of these please call the office or come to the hospital.          Discharge Follow-up with Specified Provider: Follow up with your primary OB in 6 weeks   As directed      To: Follow up with your primary OB in 6 weeks                 Prenatal labs/vax: a+/I/-/- NR  S/p tdap and  flu    Maggi Delacruz MD  03/25/21  10:29 EDT

## 2021-03-29 NOTE — PROGRESS NOTES
Continued Stay Note  [facility]     Patient Name: Анна Lopez  MRN: [id number]  Today's Date: 3/29/2021    Admit Date: 3/23/2021    Discharge Plan     Row Name 03/29/21 1434       Plan    Plan Comments  Mother: Анна Lopez,  [id number]; Infant: Yaakov Morris, [id number]. CSW reviewed infant cord toxicology results, which are negative. Referral to CPS is not warranted at this time. NKECHI Brennan        Discharge Codes    No documentation.       Expected Discharge Date and Time     Expected Discharge Date Expected Discharge Time    Mar 25, 2021             DEE DEE Brennan

## 2021-05-06 ENCOUNTER — POSTPARTUM VISIT (OUTPATIENT)
Dept: OBSTETRICS AND GYNECOLOGY | Age: 31
End: 2021-05-06

## 2021-05-06 VITALS
DIASTOLIC BLOOD PRESSURE: 80 MMHG | BODY MASS INDEX: 24.71 KG/M2 | HEIGHT: 68 IN | SYSTOLIC BLOOD PRESSURE: 122 MMHG | WEIGHT: 163 LBS

## 2021-05-06 PROCEDURE — 0503F POSTPARTUM CARE VISIT: CPT | Performed by: OBSTETRICS & GYNECOLOGY

## 2021-05-06 NOTE — PROGRESS NOTES
"GYN Visit    2021    Patient: Анна Lopez          MR#:[id number]      Chief Complaint   Patient presents with   • Postpartum Care     Delivered vag-spong, 2021, baby boy at 39w 4d, 8lbs 7oz, \"HERRERA\".  Pap 2020 = neg.  Анна is nursing. She is interested in birth control        History of Present Illness    30 y.o. female  who presents for  Pp check  No baby blues  Nursing   Baby is well  Discussed BCM options  Will try IUD        No LMP recorded.    ________________________________________  Patient Active Problem List   Diagnosis   • Headache   • Seizure disorder (CMS/HCC)   • Gastroesophageal reflux disease without esophagitis   • 32 weeks gestation of pregnancy   • Pregnancy   • Normal labor   • 39 weeks gestation of pregnancy   •  (normal spontaneous vaginal delivery)       Past Medical History:   Diagnosis Date   • Chronic kidney disease     pyelonephritis 1st trimester   • Seizures (CMS/HCC)     last seizure 4yrs ago; no meds   • Urinary tract infection     took last dose of antibiotics 2016; patient stated it was for bladder infection   • Vaginal delivery 2013       Past Surgical History:   Procedure Laterality Date   • WISDOM TOOTH EXTRACTION         Social History     Tobacco Use   Smoking Status Former Smoker   • Types: Cigarettes   • Quit date: 2016   • Years since quittin.0   Smokeless Tobacco Never Used       has a current medication list which includes the following prescription(s): pnv prenatal plus multivitamin, acetaminophen, ibuprofen, and pantoprazole.  ________________________________________    Current contraception: abstinence      The following portions of the patient's history were reviewed and updated as appropriate: allergies, current medications, past family history, past medical history, past social history, past surgical history and problem list.    Review of Systems    Pertinent items are noted in HPI.     Objective   Physical " "Exam    /80   Ht 172.7 cm (68\")   Wt 73.9 kg (163 lb)   Breastfeeding Yes   BMI 24.78 kg/m²    BP Readings from Last 3 Encounters:   05/06/21 122/80   03/25/21 120/74   03/15/21 124/82      Wt Readings from Last 3 Encounters:   05/06/21 73.9 kg (163 lb)   03/23/21 83 kg (183 lb)   03/15/21 78.9 kg (174 lb)      BMI: Estimated body mass index is 24.78 kg/m² as calculated from the following:    Height as of this encounter: 172.7 cm (68\").    Weight as of this encounter: 73.9 kg (163 lb).    Lungs: non labored breathing, no wheezing or tachpnea  Extremities: extremities normal, atraumatic, no cyanosis or edema  Skin: Skin color, texture, turgor normal. No rashes or lesions  Neurologic: Grossly normal  General:   alert, appears stated age and cooperative   Abdomen: soft, non-tender, without masses or organomegaly       Vulva: normal, perineum well healed   Vagina: normal mucosa   Cervix: no cervical motion tenderness   Uterus: deferred   Adnexa: deferred     Assessment:      Diagnoses and all orders for this visit:    1. Postpartum care following vaginal delivery (Primary)      Doing well,   Fu  6 weeks for mirena IUD        "

## 2022-03-02 ENCOUNTER — OFFICE VISIT (OUTPATIENT)
Dept: OBSTETRICS AND GYNECOLOGY | Age: 32
End: 2022-03-02

## 2022-03-02 VITALS
SYSTOLIC BLOOD PRESSURE: 120 MMHG | DIASTOLIC BLOOD PRESSURE: 70 MMHG | BODY MASS INDEX: 24.55 KG/M2 | WEIGHT: 162 LBS | HEIGHT: 68 IN

## 2022-03-02 DIAGNOSIS — Z11.3 SCREEN FOR STD (SEXUALLY TRANSMITTED DISEASE): Primary | ICD-10-CM

## 2022-03-02 PROCEDURE — 99213 OFFICE O/P EST LOW 20 MIN: CPT | Performed by: NURSE PRACTITIONER

## 2022-03-02 NOTE — PROGRESS NOTES
"Subjective     Chief Complaint   Patient presents with   • Gynecologic Exam     std CHECK       Анна Lopez is a 31 y.o.  whose LMP is Patient's last menstrual period was 2022.     Pt presents today for std screening  She has noticed a small amount of discharge  Had IC with father of her baby recently   Declines serum std screening today    No Additional Complaints Reported    The following portions of the patient's history were reviewed and updated as appropriate:vital signs, allergies, current medications, past medical history, past social history, past surgical history and problem list      Review of Systems   Pertinent items are noted in HPI.     Objective      /70   Ht 172.7 cm (68\")   Wt 73.5 kg (162 lb)   LMP 2022   Breastfeeding No   BMI 24.63 kg/m²     Physical Exam    General:   alert and no distress   Heart: Not performed today   Lungs: Not performed today.   Breast: Not performed today   Neck: na   Abdomen: {Not performed today   CVA: Not performed today   Pelvis: External genitalia: normal general appearance  Urinary system: urethral meatus normal  Vaginal: normal mucosa without prolapse or lesions, normal without tenderness, induration or masses, normal rugae and discharge, white  Cervix: normal appearance   Extremities: Not performed today   Neurologic: negative   Psychiatric: Normal affect, judgement, and mood       Lab Review   Labs: No data reviewed     Imaging   No data reviewed    Assessment/Plan     ASSESSMENT  1. Screen for STD (sexually transmitted disease)        PLAN  1.   Orders Placed This Encounter   Procedures   • NuSwab VG+ - Swab, Vagina       2. Medications prescribed this encounter:      No orders of the defined types were placed in this encounter.      3. Vaginal swab for std's, BV and yeast. Will call with results.     Follow up: 3 month(s) for annual exam     MAURI Lawson  3/2/2022           "

## 2022-03-04 LAB
A VAGINAE DNA VAG QL NAA+PROBE: ABNORMAL SCORE
BVAB2 DNA VAG QL NAA+PROBE: ABNORMAL SCORE
C ALBICANS DNA VAG QL NAA+PROBE: NEGATIVE
C GLABRATA DNA VAG QL NAA+PROBE: NEGATIVE
C TRACH DNA VAG QL NAA+PROBE: POSITIVE
MEGA1 DNA VAG QL NAA+PROBE: ABNORMAL SCORE
N GONORRHOEA DNA VAG QL NAA+PROBE: NEGATIVE
T VAGINALIS DNA VAG QL NAA+PROBE: NEGATIVE

## 2022-03-04 RX ORDER — AZITHROMYCIN 500 MG/1
TABLET, FILM COATED ORAL
Qty: 2 TABLET | Refills: 0 | Status: SHIPPED | OUTPATIENT
Start: 2022-03-04

## 2022-03-07 NOTE — LACTATION NOTE
This note was copied from a baby's chart.  P3 term baby. Mom nursed her other two babies for a few days because she reports her supply decreases after she leaves the hospital. She has breast pump at home. Encouraged pumping every 3 hrs if baby is not satisfied after nursing, keep baby awake when nursing, call for assistance with latching, offered hand pump, Mom declines saying they are hoping for D/C today. Discussed OPLC, gave card.   Hemigard Postcare Instructions: The HEMIGARD strips are to remain completely dry for at least 5-7 days.

## 2024-02-21 ENCOUNTER — OFFICE VISIT (OUTPATIENT)
Dept: OBSTETRICS AND GYNECOLOGY | Age: 34
End: 2024-02-21
Payer: COMMERCIAL

## 2024-02-21 VITALS
BODY MASS INDEX: 25.46 KG/M2 | DIASTOLIC BLOOD PRESSURE: 72 MMHG | SYSTOLIC BLOOD PRESSURE: 120 MMHG | HEIGHT: 68 IN | WEIGHT: 168 LBS

## 2024-02-21 DIAGNOSIS — Z12.4 SCREENING FOR CERVICAL CANCER: ICD-10-CM

## 2024-02-21 DIAGNOSIS — N92.0 MENORRHAGIA WITH REGULAR CYCLE: ICD-10-CM

## 2024-02-21 DIAGNOSIS — Z01.419 WELL WOMAN EXAM WITH ROUTINE GYNECOLOGICAL EXAM: Primary | ICD-10-CM

## 2024-02-21 DIAGNOSIS — Z11.3 SCREEN FOR STD (SEXUALLY TRANSMITTED DISEASE): ICD-10-CM

## 2024-02-21 DIAGNOSIS — N94.6 DYSMENORRHEA: ICD-10-CM

## 2024-02-21 DIAGNOSIS — N90.7 LABIAL CYST: ICD-10-CM

## 2024-02-21 RX ORDER — CEPHALEXIN 500 MG/1
500 CAPSULE ORAL 2 TIMES DAILY
Qty: 14 CAPSULE | Refills: 0 | Status: SHIPPED | OUTPATIENT
Start: 2024-02-21 | End: 2024-02-28

## 2024-02-21 NOTE — PROGRESS NOTES
Subjective     Chief Complaint   Patient presents with    Gynecologic Exam     AE, last pap 10/12/2020 neg  Cc:   heavy periods and painful       History of Present Illness    Анна Lopez is a 33 y.o.  who presents for annual exam.    Doing well  C/o heavy, painful menses  Has tried depo and nexplanon but had a lot of weight   Periods are regular but she has heavy bleeding for 4-5 days. She states she will saturate through [employer] plus tampon every half hour. Has to wear panty liner as well.  She is a smoker  Has also noticed a lump on her labia for a couple of years but thinks it is getting a little bigger and feels more irritated. No drainage.       Obstetric History:  OB History          3    Para   3    Term   3            AB        Living   3         SAB        IAB        Ectopic        Molar        Multiple        Live Births   3               Menstrual History:     Patient's last menstrual period was 2024.         Current contraception: abstinence  History of abnormal Pap smear: no  Received Gardasil immunization: ?  Perform regular self breast exam: yes - sometimes  Family history of uterine or ovarian cancer: yes - see hx  Family History of colon cancer: no  Family history of breast cancer: yes - see hx    Mammogram: not indicated.  Colonoscopy: not indicated.  DEXA: not indicated.    Exercise: moderately active  Calcium/Vitamin D: adequate intake    The following portions of the patient's history were reviewed and updated as appropriate: allergies, current medications, past family history, past medical history, past social history, past surgical history, and problem list.    Review of Systems   Constitutional: Negative.    Respiratory: Negative.     Cardiovascular: Negative.    Gastrointestinal: Negative.    Genitourinary: Negative.  Positive for menstrual problem.        Labial cyst   Skin: Negative.    Psychiatric/Behavioral: Negative.             Objective   Physical  Exam  Constitutional:       General: She is awake.      Appearance: Normal appearance. She is well-developed.   HENT:      Head: Normocephalic and atraumatic.      Nose: Nose normal.   Neck:      Thyroid: No thyroid mass, thyromegaly or thyroid tenderness.   Cardiovascular:      Rate and Rhythm: Normal rate and regular rhythm.      Pulses: Normal pulses.      Heart sounds: Normal heart sounds.   Pulmonary:      Effort: Pulmonary effort is normal.      Breath sounds: Normal breath sounds.   Chest:   Breasts:     Breasts are symmetrical.      Right: Normal. No swelling, bleeding, inverted nipple, mass, nipple discharge, skin change or tenderness.      Left: Normal. No swelling, bleeding, inverted nipple, mass, nipple discharge, skin change or tenderness.   Abdominal:      General: Abdomen is flat. Bowel sounds are normal.      Palpations: Abdomen is soft.      Tenderness: There is no abdominal tenderness.   Genitourinary:     General: Normal vulva.      Labia:         Right: No rash, tenderness, lesion or injury.         Left: No rash, tenderness, lesion or injury.       Urethra: No prolapse, urethral pain, urethral swelling or urethral lesion.      Vagina: Normal. No signs of injury. No vaginal discharge, erythema, tenderness, bleeding, lesions or prolapsed vaginal walls.      Cervix: Normal. No discharge, friability, lesion, erythema or cervical bleeding.      Uterus: Normal. Not enlarged, not tender and no uterine prolapse.       Adnexa: Right adnexa normal and left adnexa normal.        Right: No mass, tenderness or fullness.          Left: No mass, tenderness or fullness.        Rectum: Normal. No mass.          Comments: Approx pea sized round fluctuant cyst to right labia minora.   Musculoskeletal:      Cervical back: Normal range of motion and neck supple.   Lymphadenopathy:      Upper Body:      Right upper body: No supraclavicular adenopathy.      Left upper body: No supraclavicular adenopathy.   Skin:      "General: Skin is warm and dry.   Neurological:      General: No focal deficit present.      Mental Status: She is alert and oriented to person, place, and time.   Psychiatric:         Mood and Affect: Mood normal.         Behavior: Behavior normal. Behavior is cooperative.         Thought Content: Thought content normal.         Judgment: Judgment normal.         /72   Ht 172.7 cm (68\")   Wt 76.2 kg (168 lb)   LMP 02/01/2024   BMI 25.54 kg/m²     Assessment & Plan   Diagnoses and all orders for this visit:    1. Well woman exam with routine gynecological exam (Primary)    2. Menorrhagia with regular cycle  -     CBC & Differential    3. Dysmenorrhea    4. Screening for cervical cancer  -     IGP,CtNgTv,Apt HPV,rfx 16 / 18,45    5. Screen for STD (sexually transmitted disease)  -     IGP,CtNgTv,Apt HPV,rfx 16 / 18,45  -     RPR, Rfx Qn RPR / Confirm TP  -     Hepatitis B Surface Antigen  -     Hepatitis C Antibody  -     HIV-1 / O / 2 Ag / Antibody 4th Generation    6. Labial cyst    Other orders  -     cephalexin (Keflex) 500 MG capsule; Take 1 capsule by mouth 2 (Two) Times a Day for 7 days.  Dispense: 14 capsule; Refill: 0        All questions answered.  Breast self exam technique reviewed and patient encouraged to perform self-exam monthly.  Discussed healthy lifestyle modifications.  Recommended 30 minutes of aerobic exercise five times per week.  Discussed calcium needs to prevent osteoporosis.    -Pap and STD screening done  -Check CBC for menorrhagia  -Plan pelvic US to further evaluate  -If all normal, discussed Mirena IUD, pt agreeable                 "

## 2024-02-22 LAB
BASOPHILS # BLD AUTO: 0 X10E3/UL (ref 0–0.2)
BASOPHILS NFR BLD AUTO: 1 %
EOSINOPHIL # BLD AUTO: 0 X10E3/UL (ref 0–0.4)
EOSINOPHIL NFR BLD AUTO: 1 %
ERYTHROCYTE [DISTWIDTH] IN BLOOD BY AUTOMATED COUNT: 13.6 % (ref 11.7–15.4)
HBV SURFACE AG SERPL QL IA: NEGATIVE
HCT VFR BLD AUTO: 39.2 % (ref 34–46.6)
HCV IGG SERPL QL IA: NON REACTIVE
HGB BLD-MCNC: 12.5 G/DL (ref 11.1–15.9)
HIV 1+2 AB+HIV1 P24 AG SERPL QL IA: NON REACTIVE
IMM GRANULOCYTES # BLD AUTO: 0 X10E3/UL (ref 0–0.1)
IMM GRANULOCYTES NFR BLD AUTO: 0 %
LYMPHOCYTES # BLD AUTO: 1.7 X10E3/UL (ref 0.7–3.1)
LYMPHOCYTES NFR BLD AUTO: 38 %
MCH RBC QN AUTO: 26.3 PG (ref 26.6–33)
MCHC RBC AUTO-ENTMCNC: 31.9 G/DL (ref 31.5–35.7)
MCV RBC AUTO: 82 FL (ref 79–97)
MONOCYTES # BLD AUTO: 0.4 X10E3/UL (ref 0.1–0.9)
MONOCYTES NFR BLD AUTO: 9 %
NEUTROPHILS # BLD AUTO: 2.3 X10E3/UL (ref 1.4–7)
NEUTROPHILS NFR BLD AUTO: 51 %
PLATELET # BLD AUTO: 292 X10E3/UL (ref 150–450)
RBC # BLD AUTO: 4.76 X10E6/UL (ref 3.77–5.28)
RPR SER QL: NON REACTIVE
WBC # BLD AUTO: 4.4 X10E3/UL (ref 3.4–10.8)

## 2024-02-23 LAB
C TRACH RRNA CVX QL NAA+PROBE: NEGATIVE
CYTOLOGIST CVX/VAG CYTO: NORMAL
CYTOLOGY CVX/VAG DOC CYTO: NORMAL
CYTOLOGY CVX/VAG DOC THIN PREP: NORMAL
DX ICD CODE: NORMAL
HIV 1 & 2 AB SER-IMP: NORMAL
HPV GENOTYPE REFLEX: NORMAL
HPV I/H RISK 4 DNA CVX QL PROBE+SIG AMP: NEGATIVE
N GONORRHOEA RRNA CVX QL NAA+PROBE: NEGATIVE
OTHER STN SPEC: NORMAL
STAT OF ADQ CVX/VAG CYTO-IMP: NORMAL
T VAGINALIS RRNA SPEC QL NAA+PROBE: NEGATIVE

## 2024-04-09 ENCOUNTER — TELEPHONE (OUTPATIENT)
Dept: OBSTETRICS AND GYNECOLOGY | Age: 34
End: 2024-04-09

## 2024-07-16 NOTE — PROGRESS NOTES
Chief Complaint   Patient presents with   • Pregnancy Problem     c/o stomach pain, vomiting and urine is orange in color.(pt was seen at [facility] last night and was given fluids and zofran)       HPI: 30 y.o.  at 30w4d gestation  She is here for f/u today  Pt called yesterday noting orange urine  She goes on to say she hadn't been eating for a few days but was trying to drink water  Prone to nausea and vomiting  GERD noted as well but only intermittently taking pepcid  Seen at [facility] last night and given iv hydration and zofran, felt better after that  She still notes some nausea and stomach pain  Is taking zofran  Advised to start pepcid daily and avoid spicy foods or irritating foods  Disc adding HH as well.  She will keep her appt tomorrow and see how she feels  I suspect her sx's are being exacerbated by her heartburn sx's so starting pepcid daily should offer some relief. Also disc c/w tums prn as well  NST done yesterday was reactive and reassuring  Pt notes good FM    Vitals:    21 1056   BP: 122/64   Weight: 74.4 kg (164 lb)       ROS:  GI:  Negative  : na  Pulmonary: Negative     A/P  1. Intrauterine pregnancy at 30w4d   2. Pregnancy Risk:  COMPLICATED    Diagnoses and all orders for this visit:    1. Screening for blood or protein in urine (Primary)  -     POC Urinalysis Dipstick, Multipro    2. Dehydration        -----------------------  PLAN:   Return if symptoms worsen or fail to improve.      LARRY Sierra  2021 11:43 EST     154.9